# Patient Record
Sex: FEMALE
[De-identification: names, ages, dates, MRNs, and addresses within clinical notes are randomized per-mention and may not be internally consistent; named-entity substitution may affect disease eponyms.]

---

## 2018-01-16 ENCOUNTER — APPOINTMENT (OUTPATIENT)
Dept: HUMAN REPRODUCTION | Facility: CLINIC | Age: 28
End: 2018-01-16
Payer: MEDICARE

## 2018-01-16 PROCEDURE — 99215 OFFICE O/P EST HI 40 MIN: CPT

## 2018-01-16 PROCEDURE — 76830 TRANSVAGINAL US NON-OB: CPT

## 2018-01-16 PROCEDURE — 36415 COLL VENOUS BLD VENIPUNCTURE: CPT

## 2018-01-17 ENCOUNTER — OTHER (OUTPATIENT)
Age: 28
End: 2018-01-17

## 2018-01-17 ENCOUNTER — TRANSCRIPTION ENCOUNTER (OUTPATIENT)
Age: 28
End: 2018-01-17

## 2018-01-17 DIAGNOSIS — N97.9 FEMALE INFERTILITY, UNSPECIFIED: ICD-10-CM

## 2018-01-17 RX ORDER — MEDROXYPROGESTERONE ACETATE 10 MG/1
10 TABLET ORAL
Qty: 7 | Refills: 0 | Status: ACTIVE | COMMUNITY
Start: 2018-01-17 | End: 1900-01-01

## 2018-02-13 ENCOUNTER — APPOINTMENT (OUTPATIENT)
Dept: HUMAN REPRODUCTION | Facility: CLINIC | Age: 28
End: 2018-02-13

## 2018-03-22 ENCOUNTER — RX RENEWAL (OUTPATIENT)
Age: 28
End: 2018-03-22

## 2018-03-22 ENCOUNTER — APPOINTMENT (OUTPATIENT)
Dept: HUMAN REPRODUCTION | Facility: CLINIC | Age: 28
End: 2018-03-22
Payer: MEDICARE

## 2018-03-22 PROCEDURE — 99214 OFFICE O/P EST MOD 30 MIN: CPT

## 2018-03-22 RX ORDER — DOXYCYCLINE HYCLATE 100 MG/1
100 CAPSULE ORAL
Qty: 5 | Refills: 0 | Status: ACTIVE | COMMUNITY
Start: 2018-03-22 | End: 1900-01-01

## 2018-05-03 ENCOUNTER — APPOINTMENT (OUTPATIENT)
Dept: HUMAN REPRODUCTION | Facility: CLINIC | Age: 28
End: 2018-05-03
Payer: MEDICARE

## 2018-05-03 PROCEDURE — 36415 COLL VENOUS BLD VENIPUNCTURE: CPT

## 2018-05-03 PROCEDURE — 99213 OFFICE O/P EST LOW 20 MIN: CPT | Mod: 25

## 2018-05-03 PROCEDURE — 76830 TRANSVAGINAL US NON-OB: CPT

## 2018-05-03 RX ORDER — MEDROXYPROGESTERONE ACETATE 10 MG/1
10 TABLET ORAL
Qty: 7 | Refills: 0 | Status: ACTIVE | COMMUNITY
Start: 2018-05-03 | End: 1900-01-01

## 2018-05-03 RX ORDER — DOXYCYCLINE HYCLATE 100 MG/1
100 TABLET ORAL TWICE DAILY
Qty: 5 | Refills: 0 | Status: ACTIVE | COMMUNITY
Start: 2018-05-03 | End: 1900-01-01

## 2018-05-15 ENCOUNTER — APPOINTMENT (OUTPATIENT)
Dept: HUMAN REPRODUCTION | Facility: CLINIC | Age: 28
End: 2018-05-15
Payer: MEDICARE

## 2018-05-15 ENCOUNTER — APPOINTMENT (OUTPATIENT)
Dept: RADIOLOGY | Facility: HOSPITAL | Age: 28
End: 2018-05-15

## 2018-05-15 ENCOUNTER — OUTPATIENT (OUTPATIENT)
Dept: OUTPATIENT SERVICES | Facility: HOSPITAL | Age: 28
LOS: 1 days | End: 2018-05-15
Payer: COMMERCIAL

## 2018-05-15 DIAGNOSIS — N97.1 FEMALE INFERTILITY OF TUBAL ORIGIN: ICD-10-CM

## 2018-05-15 DIAGNOSIS — N97.9 FEMALE INFERTILITY, UNSPECIFIED: ICD-10-CM

## 2018-05-15 PROCEDURE — 74740 X-RAY FEMALE GENITAL TRACT: CPT

## 2018-05-15 PROCEDURE — 58340 CATHETER FOR HYSTEROGRAPHY: CPT

## 2018-05-15 PROCEDURE — 36415 COLL VENOUS BLD VENIPUNCTURE: CPT

## 2018-05-15 PROCEDURE — 99213 OFFICE O/P EST LOW 20 MIN: CPT | Mod: 25

## 2018-05-30 ENCOUNTER — APPOINTMENT (OUTPATIENT)
Dept: HUMAN REPRODUCTION | Facility: CLINIC | Age: 28
End: 2018-05-30
Payer: MEDICARE

## 2018-05-30 PROCEDURE — 99215 OFFICE O/P EST HI 40 MIN: CPT

## 2018-08-18 ENCOUNTER — EMERGENCY (EMERGENCY)
Facility: HOSPITAL | Age: 28
LOS: 1 days | Discharge: HOME | End: 2018-08-18
Attending: EMERGENCY MEDICINE | Admitting: EMERGENCY MEDICINE
Payer: COMMERCIAL

## 2018-08-18 VITALS
RESPIRATION RATE: 16 BRPM | OXYGEN SATURATION: 99 % | HEIGHT: 64 IN | HEART RATE: 68 BPM | SYSTOLIC BLOOD PRESSURE: 106 MMHG | TEMPERATURE: 98 F | WEIGHT: 141.98 LBS | DIASTOLIC BLOOD PRESSURE: 68 MMHG

## 2018-08-18 LAB
APPEARANCE UR: CLEAR — SIGNIFICANT CHANGE UP
BILIRUB UR-MCNC: NEGATIVE — SIGNIFICANT CHANGE UP
COLOR SPEC: YELLOW — SIGNIFICANT CHANGE UP
DIFF PNL FLD: NEGATIVE — SIGNIFICANT CHANGE UP
EPI CELLS # UR: SIGNIFICANT CHANGE UP
GLUCOSE UR QL: NEGATIVE MG/DL — SIGNIFICANT CHANGE UP
KETONES UR-MCNC: NEGATIVE — SIGNIFICANT CHANGE UP
LEUKOCYTE ESTERASE UR-ACNC: ABNORMAL
NITRITE UR-MCNC: NEGATIVE — SIGNIFICANT CHANGE UP
PH UR: 8 — SIGNIFICANT CHANGE UP (ref 5–8)
PROT UR-MCNC: NEGATIVE MG/DL — SIGNIFICANT CHANGE UP
RBC CASTS # UR COMP ASSIST: ABNORMAL /HPF (ref 0–4)
SP GR SPEC: 1.01 — SIGNIFICANT CHANGE UP (ref 1.01–1.02)
UROBILINOGEN FLD QL: NEGATIVE MG/DL — SIGNIFICANT CHANGE UP
WBC UR QL: ABNORMAL

## 2018-08-18 PROCEDURE — 99284 EMERGENCY DEPT VISIT MOD MDM: CPT

## 2018-08-18 NOTE — ED ADULT TRIAGE NOTE - TEMPERATURE IN CELSIUS (DEGREES C)
36.7
I have personally seen and examined this patient.  I have fully participated in the care of this patient. I have reviewed all pertinent clinical information, including history, physical exam, plan and the Resident’s note and agree except as noted.

## 2018-08-19 PROCEDURE — 99284 EMERGENCY DEPT VISIT MOD MDM: CPT | Mod: 25

## 2018-08-19 PROCEDURE — 87086 URINE CULTURE/COLONY COUNT: CPT

## 2018-08-19 PROCEDURE — 76830 TRANSVAGINAL US NON-OB: CPT | Mod: 26

## 2018-08-19 PROCEDURE — 81001 URINALYSIS AUTO W/SCOPE: CPT

## 2018-08-19 PROCEDURE — 76830 TRANSVAGINAL US NON-OB: CPT

## 2018-08-19 RX ORDER — CIPROFLOXACIN LACTATE 400MG/40ML
500 VIAL (ML) INTRAVENOUS ONCE
Qty: 0 | Refills: 0 | Status: COMPLETED | OUTPATIENT
Start: 2018-08-19 | End: 2018-08-19

## 2018-08-19 RX ORDER — CIPROFLOXACIN LACTATE 400MG/40ML
1 VIAL (ML) INTRAVENOUS
Qty: 14 | Refills: 0
Start: 2018-08-19 | End: 2018-08-25

## 2018-08-19 RX ADMIN — Medication 500 MILLIGRAM(S): at 00:51

## 2018-08-19 NOTE — ED PROVIDER NOTE - OBJECTIVE STATEMENT
Patient c/o left pelvic pain for a day. Some increase with urination. Mild dysuria. No fever. No n/v/d/c. No frequent urination. No back pain

## 2018-08-19 NOTE — ED PROVIDER NOTE - CHPI ED SYMPTOMS NEG
no blood in stool/no fever/no hematuria/no diarrhea/no nausea/no abdominal distension/no burning urination/no chills/no vomiting

## 2018-08-20 PROBLEM — E28.2 POLYCYSTIC OVARIAN SYNDROME: Chronic | Status: ACTIVE | Noted: 2018-08-19

## 2018-08-20 LAB
CULTURE RESULTS: SIGNIFICANT CHANGE UP
SPECIMEN SOURCE: SIGNIFICANT CHANGE UP

## 2018-08-20 NOTE — ED ADULT NURSE NOTE - NSIMPLEMENTINTERV_GEN_ALL_ED
Implemented All Universal Safety Interventions:  [place] to call system. Call bell, personal items and telephone within reach. Instruct patient to call for assistance. Room bathroom lighting operational. Non-slip footwear when patient is off stretcher. Physically safe environment: no spills, clutter or unnecessary equipment. Stretcher in lowest position, wheels locked, appropriate side rails in place.

## 2018-09-20 ENCOUNTER — APPOINTMENT (OUTPATIENT)
Dept: HUMAN REPRODUCTION | Facility: CLINIC | Age: 28
End: 2018-09-20

## 2019-02-05 ENCOUNTER — RX RENEWAL (OUTPATIENT)
Age: 29
End: 2019-02-05

## 2019-02-05 RX ORDER — METFORMIN ER 750 MG 750 MG/1
750 TABLET ORAL DAILY
Qty: 60 | Refills: 3 | Status: ACTIVE | COMMUNITY
Start: 2018-01-17 | End: 1900-01-01

## 2019-02-11 ENCOUNTER — APPOINTMENT (OUTPATIENT)
Dept: HUMAN REPRODUCTION | Facility: CLINIC | Age: 29
End: 2019-02-11

## 2019-02-28 ENCOUNTER — APPOINTMENT (OUTPATIENT)
Dept: HUMAN REPRODUCTION | Facility: CLINIC | Age: 29
End: 2019-02-28

## 2019-06-13 ENCOUNTER — APPOINTMENT (OUTPATIENT)
Dept: HUMAN REPRODUCTION | Facility: CLINIC | Age: 29
End: 2019-06-13
Payer: COMMERCIAL

## 2019-06-13 PROCEDURE — 99214 OFFICE O/P EST MOD 30 MIN: CPT | Mod: 25

## 2019-06-25 ENCOUNTER — APPOINTMENT (OUTPATIENT)
Dept: OBGYN | Facility: CLINIC | Age: 29
End: 2019-06-25

## 2020-04-08 NOTE — ED ADULT NURSE NOTE - CHPI ED NUR QUALITY2
Pended referral for [facility]. Please review diagnosis and sign off if you agree.     Thank you,  [facility] [phone] burning

## 2022-08-29 ENCOUNTER — APPOINTMENT (OUTPATIENT)
Dept: HUMAN REPRODUCTION | Facility: CLINIC | Age: 32
End: 2022-08-29

## 2022-08-29 PROCEDURE — 76830 TRANSVAGINAL US NON-OB: CPT

## 2022-08-29 PROCEDURE — 36415 COLL VENOUS BLD VENIPUNCTURE: CPT

## 2022-08-29 PROCEDURE — 99205 OFFICE O/P NEW HI 60 MIN: CPT | Mod: 25

## 2022-09-26 ENCOUNTER — APPOINTMENT (OUTPATIENT)
Dept: HUMAN REPRODUCTION | Facility: CLINIC | Age: 32
End: 2022-09-26

## 2022-09-26 PROCEDURE — 36415 COLL VENOUS BLD VENIPUNCTURE: CPT

## 2022-09-26 PROCEDURE — 76830 TRANSVAGINAL US NON-OB: CPT

## 2022-09-26 PROCEDURE — 99215 OFFICE O/P EST HI 40 MIN: CPT | Mod: 25

## 2022-09-27 ENCOUNTER — TRANSCRIPTION ENCOUNTER (OUTPATIENT)
Age: 32
End: 2022-09-27

## 2022-10-20 ENCOUNTER — APPOINTMENT (OUTPATIENT)
Dept: HUMAN REPRODUCTION | Facility: CLINIC | Age: 32
End: 2022-10-20

## 2022-10-20 PROCEDURE — 99215 OFFICE O/P EST HI 40 MIN: CPT

## 2022-11-01 ENCOUNTER — APPOINTMENT (OUTPATIENT)
Dept: HUMAN REPRODUCTION | Facility: CLINIC | Age: 32
End: 2022-11-01

## 2022-11-01 PROCEDURE — 36415 COLL VENOUS BLD VENIPUNCTURE: CPT

## 2022-11-01 PROCEDURE — 76830 TRANSVAGINAL US NON-OB: CPT

## 2022-11-01 PROCEDURE — 99213Y: CUSTOM | Mod: 25

## 2022-11-09 ENCOUNTER — APPOINTMENT (OUTPATIENT)
Dept: HUMAN REPRODUCTION | Facility: CLINIC | Age: 32
End: 2022-11-09

## 2022-11-09 PROCEDURE — 99213 OFFICE O/P EST LOW 20 MIN: CPT | Mod: 25

## 2022-11-09 PROCEDURE — 76857 US EXAM PELVIC LIMITED: CPT

## 2022-11-14 ENCOUNTER — APPOINTMENT (OUTPATIENT)
Dept: HUMAN REPRODUCTION | Facility: CLINIC | Age: 32
End: 2022-11-14

## 2022-11-14 PROCEDURE — 99213 OFFICE O/P EST LOW 20 MIN: CPT | Mod: 25

## 2022-11-14 PROCEDURE — 36415 COLL VENOUS BLD VENIPUNCTURE: CPT

## 2022-11-14 PROCEDURE — 76857 US EXAM PELVIC LIMITED: CPT

## 2022-11-17 ENCOUNTER — APPOINTMENT (OUTPATIENT)
Dept: HUMAN REPRODUCTION | Facility: CLINIC | Age: 32
End: 2022-11-17

## 2022-11-17 PROCEDURE — 36415 COLL VENOUS BLD VENIPUNCTURE: CPT

## 2022-11-17 PROCEDURE — 99213 OFFICE O/P EST LOW 20 MIN: CPT | Mod: 25

## 2022-11-18 ENCOUNTER — APPOINTMENT (OUTPATIENT)
Dept: HUMAN REPRODUCTION | Facility: CLINIC | Age: 32
End: 2022-11-18

## 2022-11-18 PROCEDURE — 89261 SPERM ISOLATION COMPLEX: CPT

## 2022-11-18 PROCEDURE — 58322 ARTIFICIAL INSEMINATION: CPT

## 2022-12-02 ENCOUNTER — APPOINTMENT (OUTPATIENT)
Dept: HUMAN REPRODUCTION | Facility: CLINIC | Age: 32
End: 2022-12-02

## 2022-12-02 PROCEDURE — 36415 COLL VENOUS BLD VENIPUNCTURE: CPT

## 2022-12-02 PROCEDURE — 76857 US EXAM PELVIC LIMITED: CPT

## 2022-12-02 PROCEDURE — 99213 OFFICE O/P EST LOW 20 MIN: CPT | Mod: 25

## 2022-12-09 NOTE — ED ADULT NURSE NOTE - CAS TRG GEN SKIN CONDITION
Subjective: This note was copied forward from the last encounter. Essential components for this patient record were reviewed and verified on this visit including:  recent hospitalizations, recent imaging, PMH, PSH, FH, SOC HX, Allergies, and Medications were reviewed and updated as appropriate. In addition, the assessment and plan were copied from prior office note and updated accordingly. Patient ID: Aleah Patel is a 76 y.o. female. CHAI Brewer presents today for 6 mos follow up with history breast cancer, left. The cancer was located in the 2 o'clock position of left breast.  Breast cancer risk factors include age, gender, menarche before age 14. Age of menarche was 5. Age of menopause was 46. Patient was on birth control for 6 months in her 25s. Patient is . Age of first live birth was 28. Patient did breast feed. 2019 bilateral screening mammogram:  There is a high density, irregular mass with indistinct margins seen in the posterior upper outer quadrant of left breast.     2019 left Breast U/S:  Irregular solid mass with angular margins measuring 25 x 20 x 21 mm in the left breast at 2 o'clock located 8 centimeters from the nipple. No axillary LN.     2019:  Mass, left breast at 2:00, core needle biopsy: Invasive ductal carcinoma,nuclear grade 3, see comment. 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 Breast Cancer Marker Studies:  ER: Negative  CA: Negative  Her-2/francisco Positive/3+    2019 MRI Bilateral Breast:  An irregular, enhancing mass is again noted in the left breast 2:00 which measures 2.0 x 2.2 x 2.6 cm. No axillary LN. No evidence of neoplasm on right. T2 N0 M0    10/23/2019 Completed NACT TCHP per medical oncology, Dr. Beatriz Lefort. 2019 MRI of the bilateral breast:  Near complete response to therapy on the left. There is no axillary lymphadenopathy.   No evidence of neoplasm on the right. 12/18/2019 Left needle localized lumpectomy, blue dye injection, and left axillary sentinel lymph node excision:  A. Left breast, new superior-lateral margin, excision:   -Focal adenosis, columnar cell changes and stromal fibrosis/hyalinosis, negative for malignancy;   -Rare microcalcifications in benign tubules. B. [place] lymph node #1, biopsy: Reactive node, negative for malignancy. C.  [place] lymph node #2, biopsy: Reactive node, negative for malignancy. D.  Left breast, lumpectomy with needle localization:   - Invasive adenocarcinoma of no special type (ductal, NOS), grade 3;   - Ductal carcinoma in situ, high nuclear grade, comedo/solid with necrosis;   - Scar of previous biopsy site, see comment. Comment:  Although the invasive carcinoma is very close to the superior margin in specimen D (3 mm), additional superior-lateral margins in specimen A (at least 2.0 cm in thickness) is completely negative for carcinoma.      CANCER CASE SUMMARY  Procedure: Excision  Specimen Laterality: Left  Tumor Site: Invasive Carcinoma: 12:00 (per report: HES-)  Tumor Size: Size of Largest Invasive Carcinoma: 1.4 X 1.3 X1.3 cm  Histologic Type of Invasive Carcinoma: Invasive carcinoma of no special type (ductal, NOS)  Histologic Grade: Lidya Histologic Score  Glandular/tubular differentiation: Score 3  Nuclear pleomorphism: Score 3  Mitotic rate: Score 3  Overall grade: Grade 3 (scores of 9)  Tumor Focality: Single focus of invasive carcinoma  Ductal Carcinoma In Situ (DCIS): Present  Size of DCIS: Intermingled with invasive carcinoma  Number of blocks with DCIS: 3  Number of blocks examined: 14  Architectural patternS: Comedo/solid  Nuclear grade: High  Ncrosis present: Central and focal  Lobular Carcinoma In Situ (LCIS): Not identified  Margins: Uninvolved by invasive carcinoma and DCIS  Distance from closest margin: 3 mm  Specify closest margin: surperior (see comment)  Regional lymph Nodes: Uninvolved by tumor cells  Total number of lymph nodes examined: 2  Number of sentinel nodes examined: 2  Treatment Effect: No known presurgical therapy  Lymph-Vascular Invasion: Not identified  Pathologic Staging (pTNM, AJCC 8TH Edition)  Primary tumor: pT1c  Regional lymph nodes (modifier- (sn) sentinel nodes examined: (sn)0  ER: Negative  PA: Negative  Her-2/francisco Positive/3+    Medical Oncology, Ann Salinas Recommended Ado-Trastuzumab alone for 14 cycles. 01/06/2020 cycle # 1 Ado-Trastuzumab was started. 03/04/2020 completed adjuvant radiation therapy. 05/21/2020 Bilateral Diagnostic Mammogram:  Negative for malignancy. 05/29/2020 Left Breast U/S:  Area of post surgical change in the left breast is benign. 11/12/2020 completed cycle #14 Ado-Trastuzumab     Review of Systems   Constitutional:  Negative for activity change, appetite change, chills, fatigue, fever and unexpected weight change. Doing well. She is without breast related complaints on this visit. Respiratory:  Negative for cough and shortness of breath. Cardiovascular:  Negative for chest pain and palpitations. Gastrointestinal:  Negative for blood in stool, constipation, diarrhea (History of irritable bowel. A colonoscopy in October 2021 was reported as negative.) and nausea. Musculoskeletal:  Negative for arthralgias and back pain. Neurological:  Negative for light-headedness and headaches. Psychiatric/Behavioral:  The patient is not nervous/anxious. Objective:   Physical Exam  Vitals and nursing note reviewed. Constitutional:       General: She is not in acute distress. Appearance: She is well-developed. She is not diaphoretic. Comments: ECOG remains stable at 0; no apparent distress   HENT:      Head: Normocephalic and atraumatic. Mouth/Throat:      Pharynx: No oropharyngeal exudate. Eyes:      General: No scleral icterus. Right eye: No discharge. Left eye: No discharge. normal.   Psychiatric:         Behavior: Behavior normal.         Thought Content: Thought content normal.         Judgment: Judgment normal.      Assessment:     Luiza Johnson is a 76 y.o. extremely pleasant female who presents for follow-up of her left breast cancer. Luiza Johnson is a pleasant 76 y.o. female who had a Left breast cancer; the malignancy was located in the 2 o'clock position of left breast.     On 05/06/2019: Mass, left breast at 2:00, core needle biopsy: Invasive ductal carcinoma,nuclear grade 3, see comment. 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 Breast Cancer Marker Studies:  ER: Negative  VT: Negative  Her-2/francisco Positive/3+    MRI Bilateral Breast 05/23/2019: An irregular, enhancing mass is again noted in the left breast 2:00 which measures 2.0 x 2.2 x 2.6 cm.  T2 N0 M0    10/23/2019 Completed NACT TCHP per medical oncology, Dr. Dwight Hedrick. MRI Breast Bilateral 11/07/2019: Near complete response to therapy. 12/18/2019 Left needle localized lumpectomy, blue dye injection, and left axillary sentinel lymph node excision:  A. Left breast, new superior-lateral margin, excision:   -Focal adenosis, columnar cell changes and stromal fibrosis/hyalinosis, negative for malignancy;   -Rare microcalcifications in benign tubules. B. [place] lymph node #1, biopsy: Reactive node, negative for malignancy. C.  [place] lymph node #2, biopsy: Reactive node, negative for malignancy. D.  Left breast, lumpectomy with needle localization:   - Invasive adenocarcinoma of no special type (ductal, NOS), grade 3;   - Ductal carcinoma in situ, high nuclear grade, comedo/solid with necrosis;   - Scar of previous biopsy site, see comment. Comment:  Although the invasive carcinoma is very close to the superior margin in specimen D (3 mm), additional superior-lateral margins in specimen A (at least 2.0 cm in thickness) is completely negative for carcinoma.      CANCER CASE SUMMARY: Left invasive carcinoma, 12:00. Tumor size 1.4 x 1.3 x 1.3 cm. Invasive carcinoma of no special type, ductal, NOS. Overall grade 3 (scores of 9). Single focus of invasive carcinoma. Ductal Carcinoma In Situ (DCIS): Present. Size of DCIS: Intermingled with invasive carcinoma Number of blocks with DCIS: 3  Number of blocks examined: 14 Architectural patternS: Comedo/solid. Nuclear grade: High. Necrosis present: Central and focal  Lobular Carcinoma In Situ (LCIS): Not identified  Margins: Uninvolved by invasive carcinoma and DCIS  Regional lymph Nodes: Uninvolved by tumor cells (0/2). Treatment Effect: No known presurgical therapy. Lymph-Vascular Invasion: Not identified  Pathologic Staging (pTNM, AJCC 8TH Edition) Primary tumor: pT1c, Regional lymph nodes (modifier- (sn) sentinel nodes examined: (sn)0. ER: Negative; WI: Negative; Her-2/francisco Positive/3+    Medical OncologyEsa Recommended Ado-Trastuzumab alone for 14 cycles. Cycle # 1 Ado-Trastuzumab was on 01/06/2020.  03/11/2020 completed adjuvant RT   05/21/2020 Bilateral Diagnostic Mammogram:  Negative for malignancy. 05/29/2020 Left Breast U/S:  Area of post surgical change in the left breast is benign. 11/12/2020 completed cycle # 14 Ado-Trastuzumab.  06/01/2021 bilateral screening mammogram: Negative, BI-RADS 1.  08/25/2021 Right IJ mediport removal and excision of left axillary epidermal inclusion cyst, 5 mm: Negative for malignancy. 09/03/2021 completed 4 visits with occupational therapy; discharged to continue at home. 09/2021 screening colonoscopy per Dr. Dom Shrestha @ [facility]. 12/01/2021 Clinical follow up is without evidence of recurrent disease. We will plan to see her back in June with mammogram same day. Continue lymphedema management at home. 06/13/2022 bilateral screening mammogram: Negative, BI-RADS 1.  12/14/2022 clinical follow-up is without evidence of recurrent disease.   Lymphedema is controlled with compression vest daily. We reviewed NCCN guidelines for breast cancer follow up. We had a long discussion regarding her breast density and current recommendations from [employer] of radiology for consideration of bilateral whole breast ultrasounds in addition to an annual screening mammogram.  She was advised to contact her insurance company to ensure coverage of the imaging and will let us know if she would like to proceed. Otherwise, we will plan to see in 6 months with a bilateral screening mammogram same day. Plan:     Continue monthly breast/chest wall self examination; detailed instructions reviewed today. Bring any changes to your physician's attention. Continue healthy diet and exercise routinely as tolerated. Avoid alcohol. Limit caffeine intake. Wear a good supportive bra. Repeat mammogram June 2023. Continue follow up with Primary Care/Medical Oncology, and all specialties as directed. Bilateral whole breast ultrasounds to be done should she choose to do so-no office visit required. RTC 6 months with bilateral screening mammogram same day. I spent a total of 20 minutes on the date of the service which included preparing to see the patient, face-to-face patient care, completing clinical documentation, obtaining and/or reviewing separately obtained history, performing a medically appropriate examination, counseling and educating the patient/family/caregiver, ordering medications, tests, or procedures, communicating with other HCPs (not separately reported), independently interpreting results (not separately reported), communicating results to the patient/family/caregiver and care coordination (not separately reported). This document is generated, in part, by voice recognition software and thus syntax and grammatical errors are possible.     Amairani [address] RN, MSN, APRN-CNP, [address]  Advanced Oncology Certified Nurse Practitioner  Department of Breast Surgery  [facility] [place]/  [facility] in collaboration with Dr. Yulissa Jacobson.  Carlos Manuel/Dr. Rohit Paniagua/Dr. Valdes Board APRN-CNP [place]

## 2022-12-15 ENCOUNTER — APPOINTMENT (OUTPATIENT)
Dept: HUMAN REPRODUCTION | Facility: CLINIC | Age: 32
End: 2022-12-15

## 2022-12-15 PROCEDURE — 99213 OFFICE O/P EST LOW 20 MIN: CPT | Mod: 25

## 2022-12-15 PROCEDURE — 36415 COLL VENOUS BLD VENIPUNCTURE: CPT

## 2022-12-15 PROCEDURE — 76857 US EXAM PELVIC LIMITED: CPT

## 2022-12-19 ENCOUNTER — APPOINTMENT (OUTPATIENT)
Dept: HUMAN REPRODUCTION | Facility: CLINIC | Age: 32
End: 2022-12-19

## 2022-12-19 PROCEDURE — 36415 COLL VENOUS BLD VENIPUNCTURE: CPT

## 2022-12-21 ENCOUNTER — APPOINTMENT (OUTPATIENT)
Dept: HUMAN REPRODUCTION | Facility: CLINIC | Age: 32
End: 2022-12-21

## 2022-12-21 PROCEDURE — 36415 COLL VENOUS BLD VENIPUNCTURE: CPT

## 2022-12-21 PROCEDURE — 76857 US EXAM PELVIC LIMITED: CPT

## 2022-12-21 PROCEDURE — 99213 OFFICE O/P EST LOW 20 MIN: CPT | Mod: 25

## 2022-12-23 ENCOUNTER — APPOINTMENT (OUTPATIENT)
Dept: HUMAN REPRODUCTION | Facility: CLINIC | Age: 32
End: 2022-12-23

## 2022-12-23 PROCEDURE — 58322 ARTIFICIAL INSEMINATION: CPT

## 2022-12-23 PROCEDURE — 89260 SPERM ISOLATION SIMPLE: CPT

## 2023-01-10 ENCOUNTER — APPOINTMENT (OUTPATIENT)
Dept: HUMAN REPRODUCTION | Facility: CLINIC | Age: 33
End: 2023-01-10

## 2023-01-14 ENCOUNTER — APPOINTMENT (OUTPATIENT)
Dept: HUMAN REPRODUCTION | Facility: CLINIC | Age: 33
End: 2023-01-14
Payer: COMMERCIAL

## 2023-01-14 PROCEDURE — 36415 COLL VENOUS BLD VENIPUNCTURE: CPT

## 2023-01-14 PROCEDURE — 76830 TRANSVAGINAL US NON-OB: CPT

## 2023-01-14 PROCEDURE — 99213 OFFICE O/P EST LOW 20 MIN: CPT | Mod: 25

## 2023-01-16 ENCOUNTER — EMERGENCY (EMERGENCY)
Facility: HOSPITAL | Age: 33
LOS: 0 days | Discharge: HOME | End: 2023-01-17
Attending: EMERGENCY MEDICINE
Payer: COMMERCIAL

## 2023-01-16 VITALS — WEIGHT: 134.92 LBS | HEIGHT: 64 IN

## 2023-01-16 LAB
ALBUMIN SERPL ELPH-MCNC: 3.7 G/DL — SIGNIFICANT CHANGE UP (ref 3.3–5)
ALP SERPL-CCNC: 57 U/L — SIGNIFICANT CHANGE UP (ref 40–120)
ALT FLD-CCNC: 41 U/L — SIGNIFICANT CHANGE UP (ref 12–78)
ANION GAP SERPL CALC-SCNC: 9 MMOL/L — SIGNIFICANT CHANGE UP (ref 5–17)
APPEARANCE UR: CLEAR — SIGNIFICANT CHANGE UP
AST SERPL-CCNC: 35 U/L — SIGNIFICANT CHANGE UP (ref 15–37)
BACTERIA # UR AUTO: ABNORMAL
BASOPHILS # BLD AUTO: 0.06 K/UL — SIGNIFICANT CHANGE UP (ref 0–0.2)
BASOPHILS NFR BLD AUTO: 0.5 % — SIGNIFICANT CHANGE UP (ref 0–2)
BILIRUB SERPL-MCNC: 0.6 MG/DL — SIGNIFICANT CHANGE UP (ref 0.2–1.2)
BILIRUB UR-MCNC: NEGATIVE — SIGNIFICANT CHANGE UP
BLD GP AB SCN SERPL QL: SIGNIFICANT CHANGE UP
BUN SERPL-MCNC: 6 MG/DL — LOW (ref 7–23)
CALCIUM SERPL-MCNC: 9.3 MG/DL — SIGNIFICANT CHANGE UP (ref 8.5–10.1)
CHLORIDE SERPL-SCNC: 103 MMOL/L — SIGNIFICANT CHANGE UP (ref 96–108)
CO2 SERPL-SCNC: 27 MMOL/L — SIGNIFICANT CHANGE UP (ref 22–31)
COLOR SPEC: YELLOW — SIGNIFICANT CHANGE UP
CREAT SERPL-MCNC: 0.66 MG/DL — SIGNIFICANT CHANGE UP (ref 0.5–1.3)
DIFF PNL FLD: ABNORMAL
EGFR: 119 ML/MIN/1.73M2 — SIGNIFICANT CHANGE UP
EOSINOPHIL # BLD AUTO: 0.01 K/UL — SIGNIFICANT CHANGE UP (ref 0–0.5)
EOSINOPHIL NFR BLD AUTO: 0.1 % — SIGNIFICANT CHANGE UP (ref 0–6)
EPI CELLS # UR: SIGNIFICANT CHANGE UP
GLUCOSE SERPL-MCNC: 111 MG/DL — HIGH (ref 70–99)
GLUCOSE UR QL: NEGATIVE — SIGNIFICANT CHANGE UP
HCG SERPL-ACNC: <1 MIU/ML — SIGNIFICANT CHANGE UP
HCT VFR BLD CALC: 39.5 % — SIGNIFICANT CHANGE UP (ref 34.5–45)
HGB BLD-MCNC: 13.2 G/DL — SIGNIFICANT CHANGE UP (ref 11.5–15.5)
IMM GRANULOCYTES NFR BLD AUTO: 0.2 % — SIGNIFICANT CHANGE UP (ref 0–0.9)
KETONES UR-MCNC: ABNORMAL
LACTATE SERPL-SCNC: 1.1 MMOL/L — SIGNIFICANT CHANGE UP (ref 0.7–2)
LEUKOCYTE ESTERASE UR-ACNC: ABNORMAL
LIDOCAIN IGE QN: 76 U/L — SIGNIFICANT CHANGE UP (ref 73–393)
LYMPHOCYTES # BLD AUTO: 1.29 K/UL — SIGNIFICANT CHANGE UP (ref 1–3.3)
LYMPHOCYTES # BLD AUTO: 9.9 % — LOW (ref 13–44)
MCHC RBC-ENTMCNC: 28.3 PG — SIGNIFICANT CHANGE UP (ref 27–34)
MCHC RBC-ENTMCNC: 33.4 GM/DL — SIGNIFICANT CHANGE UP (ref 32–36)
MCV RBC AUTO: 84.8 FL — SIGNIFICANT CHANGE UP (ref 80–100)
MONOCYTES # BLD AUTO: 0.73 K/UL — SIGNIFICANT CHANGE UP (ref 0–0.9)
MONOCYTES NFR BLD AUTO: 5.6 % — SIGNIFICANT CHANGE UP (ref 2–14)
NEUTROPHILS # BLD AUTO: 10.9 K/UL — HIGH (ref 1.8–7.4)
NEUTROPHILS NFR BLD AUTO: 83.7 % — HIGH (ref 43–77)
NITRITE UR-MCNC: NEGATIVE — SIGNIFICANT CHANGE UP
PH UR: 5 — SIGNIFICANT CHANGE UP (ref 5–8)
PLATELET # BLD AUTO: 278 K/UL — SIGNIFICANT CHANGE UP (ref 150–400)
POTASSIUM SERPL-MCNC: 3.7 MMOL/L — SIGNIFICANT CHANGE UP (ref 3.5–5.3)
POTASSIUM SERPL-SCNC: 3.7 MMOL/L — SIGNIFICANT CHANGE UP (ref 3.5–5.3)
PROT SERPL-MCNC: 8.2 GM/DL — SIGNIFICANT CHANGE UP (ref 6–8.3)
PROT UR-MCNC: 30 MG/DL
RAPID RVP RESULT: SIGNIFICANT CHANGE UP
RBC # BLD: 4.66 M/UL — SIGNIFICANT CHANGE UP (ref 3.8–5.2)
RBC # FLD: 12.6 % — SIGNIFICANT CHANGE UP (ref 10.3–14.5)
RBC CASTS # UR COMP ASSIST: SIGNIFICANT CHANGE UP /HPF (ref 0–4)
SARS-COV-2 RNA SPEC QL NAA+PROBE: SIGNIFICANT CHANGE UP
SODIUM SERPL-SCNC: 139 MMOL/L — SIGNIFICANT CHANGE UP (ref 135–145)
SP GR SPEC: 1.02 — SIGNIFICANT CHANGE UP (ref 1.01–1.02)
UROBILINOGEN FLD QL: NEGATIVE — SIGNIFICANT CHANGE UP
WBC # BLD: 13.02 K/UL — HIGH (ref 3.8–10.5)
WBC # FLD AUTO: 13.02 K/UL — HIGH (ref 3.8–10.5)
WBC UR QL: SIGNIFICANT CHANGE UP /HPF (ref 0–5)

## 2023-01-16 PROCEDURE — 83605 ASSAY OF LACTIC ACID: CPT

## 2023-01-16 PROCEDURE — 86900 BLOOD TYPING SEROLOGIC ABO: CPT

## 2023-01-16 PROCEDURE — 86850 RBC ANTIBODY SCREEN: CPT

## 2023-01-16 PROCEDURE — 96374 THER/PROPH/DIAG INJ IV PUSH: CPT

## 2023-01-16 PROCEDURE — 0225U NFCT DS DNA&RNA 21 SARSCOV2: CPT

## 2023-01-16 PROCEDURE — 96375 TX/PRO/DX INJ NEW DRUG ADDON: CPT

## 2023-01-16 PROCEDURE — 80053 COMPREHEN METABOLIC PANEL: CPT

## 2023-01-16 PROCEDURE — 76830 TRANSVAGINAL US NON-OB: CPT | Mod: 26

## 2023-01-16 PROCEDURE — 93975 VASCULAR STUDY: CPT

## 2023-01-16 PROCEDURE — 99285 EMERGENCY DEPT VISIT HI MDM: CPT | Mod: 25

## 2023-01-16 PROCEDURE — 87086 URINE CULTURE/COLONY COUNT: CPT

## 2023-01-16 PROCEDURE — 71045 X-RAY EXAM CHEST 1 VIEW: CPT | Mod: 26

## 2023-01-16 PROCEDURE — 93975 VASCULAR STUDY: CPT | Mod: 26

## 2023-01-16 PROCEDURE — 99285 EMERGENCY DEPT VISIT HI MDM: CPT

## 2023-01-16 PROCEDURE — 74177 CT ABD & PELVIS W/CONTRAST: CPT | Mod: MA

## 2023-01-16 PROCEDURE — 36415 COLL VENOUS BLD VENIPUNCTURE: CPT

## 2023-01-16 PROCEDURE — 96376 TX/PRO/DX INJ SAME DRUG ADON: CPT

## 2023-01-16 PROCEDURE — 76830 TRANSVAGINAL US NON-OB: CPT

## 2023-01-16 PROCEDURE — 83690 ASSAY OF LIPASE: CPT

## 2023-01-16 PROCEDURE — 81001 URINALYSIS AUTO W/SCOPE: CPT

## 2023-01-16 PROCEDURE — 85025 COMPLETE CBC W/AUTO DIFF WBC: CPT

## 2023-01-16 PROCEDURE — 86901 BLOOD TYPING SEROLOGIC RH(D): CPT

## 2023-01-16 PROCEDURE — 71045 X-RAY EXAM CHEST 1 VIEW: CPT

## 2023-01-16 PROCEDURE — 74177 CT ABD & PELVIS W/CONTRAST: CPT | Mod: 26,MA

## 2023-01-16 PROCEDURE — 84702 CHORIONIC GONADOTROPIN TEST: CPT

## 2023-01-16 RX ORDER — MORPHINE SULFATE 50 MG/1
4 CAPSULE, EXTENDED RELEASE ORAL ONCE
Refills: 0 | Status: DISCONTINUED | OUTPATIENT
Start: 2023-01-16 | End: 2023-01-16

## 2023-01-16 RX ORDER — ONDANSETRON 8 MG/1
4 TABLET, FILM COATED ORAL ONCE
Refills: 0 | Status: COMPLETED | OUTPATIENT
Start: 2023-01-16 | End: 2023-01-16

## 2023-01-16 RX ORDER — SODIUM CHLORIDE 9 MG/ML
1000 INJECTION INTRAMUSCULAR; INTRAVENOUS; SUBCUTANEOUS ONCE
Refills: 0 | Status: COMPLETED | OUTPATIENT
Start: 2023-01-16 | End: 2023-01-16

## 2023-01-16 RX ORDER — FAMOTIDINE 10 MG/ML
20 INJECTION INTRAVENOUS ONCE
Refills: 0 | Status: COMPLETED | OUTPATIENT
Start: 2023-01-16 | End: 2023-01-16

## 2023-01-16 RX ORDER — ONDANSETRON 8 MG/1
1 TABLET, FILM COATED ORAL
Qty: 20 | Refills: 0
Start: 2023-01-16

## 2023-01-16 RX ADMIN — SODIUM CHLORIDE 1000 MILLILITER(S): 9 INJECTION INTRAMUSCULAR; INTRAVENOUS; SUBCUTANEOUS at 21:09

## 2023-01-16 RX ADMIN — ONDANSETRON 4 MILLIGRAM(S): 8 TABLET, FILM COATED ORAL at 23:37

## 2023-01-16 RX ADMIN — ONDANSETRON 4 MILLIGRAM(S): 8 TABLET, FILM COATED ORAL at 21:08

## 2023-01-16 RX ADMIN — MORPHINE SULFATE 4 MILLIGRAM(S): 50 CAPSULE, EXTENDED RELEASE ORAL at 21:08

## 2023-01-16 RX ADMIN — FAMOTIDINE 20 MILLIGRAM(S): 10 INJECTION INTRAVENOUS at 21:08

## 2023-01-16 NOTE — ED STATDOCS - OBJECTIVE STATEMENT
33 y/o F with a PMHx of PCOS presents to the ED c/o abd pain and nausea upon waking up. pt also noted general malaise. Took a nap, woke up at 6 pm and had RLQ pain. Felt feverish and nauseous. Did not vomit.

## 2023-01-16 NOTE — ED STATDOCS - PROGRESS NOTE DETAILS
33 y/o F with PMH of PCOS presents with abdominal pain, nausea today. +subjective fever. Denies vomiting. PE: Well appearing. Cardiac: s1s2, RRR. lungs; CTAB. Abdomen: NBS x4, soft, +RLQ tenderness. No CVAT. A/P; r/o appendicitis, ovarian pathology. Plan for labs CT, US, analgesia, antiemetics, reassess. - Ranjan Lema PA-C Labs and imaging with no actionable findings. CT findings regarding pancreas reviewed. Patient notes history of pancreatic cyst which was drained. Feels improvement in symptoms following 2nd dose of zofran, will attempt PO trial. - Ranjan Lema PA-C pt is able to eat crackers and drink, wants to go home will prescribe zofran and melba Redd DO

## 2023-01-16 NOTE — ED ADULT TRIAGE NOTE - CHIEF COMPLAINT QUOTE
Pt c/o RLQ abdominal painx2 hrs. No medication taken PTA. Vomit x1 in am, no food all day bc Nauseas. Hx of gallbladder removal 14 years ago. - Allergies. LMP today

## 2023-01-16 NOTE — ED STATDOCS - NSFOLLOWUPINSTRUCTIONS_ED_ALL_ED_FT
Gastroenteritis viral en los adultos    Viral Gastroenteritis, Adult    Body outline showing the digestive tract, including the stomach, small intestine, and large intestine.   La gastroenteritis viral también se conoce conor gripe estomacal. Esta afección podría afectarle el estómago, el intestino hernandez y el intestino grueso. Puede causar heces líquidas (diarrea), fiebre y vómitos repentinos. Esta afección es el resultado de determinados microbios (virus). Estos microbios pueden transmitirse de bennie persona a otra con mucha facilidad (son contagiosos).    La diarrea y los vómitos pueden hacer que se sienta débil y que no tenga bennie cantidad suficiente de agua en el cuerpo (se deshidrate). Thendara puede hacerlo sentir cansado y sediento, producirle sequedad en la boca y disminuir la frecuencia con la que orina. Es importante restituir los líquidos que se pierden a causa de la diarrea y los vómitos.      ¿Cuáles son las causas?    •Usted puede enfermarse al contagiarse microbios de otras personas.    •También puede enfermarse de las siguientes maneras:  •Al comer alimentos, beber agua o tocar bennie superficie que tengan los microbios (están contaminados).      •Al compartir utensilios u otros artículos personales con bennie persona enferma.          ¿Qué incrementa el riesgo?    •Tener debilitado el sistema de defensa del cuerpo (sistema inmunitario).      •Vivir con carmen o más niños menores de 2 años.      •Vivir en un hogar de ancianos.      •Viajar en cruceros.        ¿Cuáles son los signos o síntomas?    Los síntomas de esta afección aparecen repentinamente. Pueden durar algunos días o incluso bennie semana.•Los síntomas frecuentes incluyen los siguientes:  •Heces acuosas.      •Vómitos.      •Otros síntomas pueden incluir los siguientes:  •Fiebre.      •Dolor de vinay.      •Sensación de cansancio (fatiga).      •Dolor de vientre (abdomen).      •Escalofríos.      •Sensación de debilidad.      •Sensación de que va a vomitar (náuseas).      •Christine musculares.      •Falta de apetito.          ¿Cómo se trata?    Por lo general, esta afección desaparece por sí devaughn. El objetivo del tratamiento es reponer los líquidos que se pierden. El tratamiento de esta afección puede incluir:  •Bennie SRO (solución de rehidratación oral). Esta es bennie bebida que ayuda a reponer los líquidos y los minerales que el cuerpo perdió. Se vende en farmacias y tiendas.      •Medicamentos para aliviar los síntomas.      •Suplementos probióticos para reducir los síntomas de heces acuosas.      •Recibir líquidos por un tubo (catéter) intravenoso si es necesario.      Los adultos mayores y las personas que tienen otras enfermedades o que tienen debilitado el sistema de defensa del cuerpo corren un mayor riesgo al no tener bennie cantidad suficiente de agua del cuerpo.      Siga estas indicaciones en taveras casa:      Comida y bebida   Three cups showing dark yellow, yellow, and pale yellow pee.   •[place] bennie SRO conor se lo haya indicado el médico.    •En la medida en que pueda, rebekah líquidos transparentes en pequeñas cantidades. Los líquidos transparentes son, por ejemplo:  •Agua.      •Trocitos de hielo.      •Jugo de frutas con agua agregada (diluido).      •Bebidas deportivas de bajas calorías.        •Rebekah suficiente líquido para mantener el pis (la orina) de color amarillo pálido.      •Coma pequeñas cantidades de alimentos saludables cada 3 a 4 horas según taveras tolerancia. Estos pueden incluir cereales integrales, frutas, verduras, shoshana magras y yogur.      •Evite los líquidos con alto contenido de azúcar o cafeína. Thendara incluye bebidas energéticas, bebidas deportivas y gaseosas.      •Evite los alimentos condimentados o con alto contenido de grasa.      •Evite philomena alcohol.        Indicaciones generales   Washing hands with soap and water.   •Lávese las aline con frecuencia. Thendara es muy importante después de hacer heces acuosas o vomitar. Use un desinfectante para aline si no dispone de agua y jabón.      •Asegúrese de que todas las personas que viven en taveras casa se laven david las aline y con frecuencia.      •Use los medicamentos de venta gabriel y los recetados solamente conor se lo haya indicado el médico.      •Descanse en taveras casa hasta sentirse mejor.      •Controle taveras afección para detectar cualquier cambio.      •[place] un baño caliente para ayudar a disminuir el ardor o el dolor que produce la diarrea.      •Concurra a todas las visitas de seguimiento.        Comuníquese con un médico si:    •No puede retener los líquidos.      •Felicita síntomas empeoran.      •Aparecen nuevos síntomas.      •Se siente aturdido o mareado.      •Tiene calambres musculares.        Solicite ayuda de inmediato si:    •Siente dolor en el pecho.      •Tiene problemas para respirar o respira muy rápido.      •Tiene latidos cardíacos acelerados.      •Se siente muy débil o se desmaya.      •Siente un dolor de vinay intenso, rigidez en el darren, o ambas cosas.      •Tiene bennie erupción cutánea.      •Tiene dolor muy intenso en el vientre, cólicos o meteorismo.      •Siente la piel fría y húmeda.      •Se siente desorientado (confundido).      •Siente dolor al orinar.    •Presenta signos de no tener suficiente agua en el cuerpo, por ejemplo:  •La orina es oscura, es muy escasa o no orina.      •Labios agrietados.      •Sequedad de boca.      •Ojos hundidos.      •Se siente muy somnoliento.      •Sensación de debilidad.      •Presenta signos de sangrado, conor los siguientes:  •Observa roque en el vómito.      •El vómito se asemeja al poso del café.      •Hace materia fecal con roque, de color april o que parece alquitrán.        Estos síntomas pueden indicar bennie emergencia. Solicite ayuda de inmediato. Llame al 911.   • No espere a haydee si los síntomas desaparecen.       • No conduzca por felicita propios medios hasta el hospital.         Resumen    •La gastroenteritis viral también se conoce conor gripe estomacal.      •Esta afección puede provocar heces líquidas (diarrea), fiebre y vómitos de forma repentina.      •Estos microbios pueden transmitirse de bennie persona a otra con mucha facilidad.      •Rebekah bennie SRO (solución de rehidratación oral), conor se lo haya indicado el médico. Es bennie bebida que se vende en farmacias y tiendas.      •Lávese las aline con frecuencia, especialmente después de tener heces líquidas o vómitos. Use un desinfectante para aline si no dispone de agua y jabón.      Esta información no tiene conor fin reemplazar el consejo del médico. Asegúrese de hacerle al médico cualquier pregunta que tenga.      Document Revised: 11/10/2022 Document Reviewed: 11/10/2022    Elsevier Patient Education © 2022 Elsevier Inc.

## 2023-01-16 NOTE — ED STATDOCS - ATTENDING APP SHARED VISIT CONTRIBUTION OF CARE
Dr. Redd: I performed a face to face bedside interview with patient regarding history of present illness, review of symptoms and past medical history. I completed an independent physical exam.  I have discussed patient's plan of care with PA.   I agree with note as stated above, having amended the EMR as needed to reflect my findings.   This includes HISTORY OF PRESENT ILLNESS, HIV, PAST MEDICAL/SURGICAL/FAMILY/SOCIAL HISTORY, ALLERGIES AND HOME MEDICATIONS, REVIEW OF SYSTEMS, PHYSICAL EXAM, and any PROGRESS NOTES during the time I functioned as the attending physician for this patient.

## 2023-01-16 NOTE — ED STATDOCS - CLINICAL SUMMARY MEDICAL DECISION MAKING FREE TEXT BOX
pt p/w appendicitis vs ovarian cyst less likely torsion labs, UCG, US to r/o ovarian abnormality and CT to r/o appendicitis.

## 2023-01-16 NOTE — ED ADULT NURSE NOTE - OBJECTIVE STATEMENT
Pt A&Ox4, presents to the ED c/o RLQ abdominal pain x2 hrs. No medication taken PTA. Vomit x1 in am, no food all day due to nausea. Hx of gallbladder removal 14 years ago. - Allergies. LMP today. Denies chest pain SOB. Endorses chills.

## 2023-01-16 NOTE — ED STATDOCS - PATIENT PORTAL LINK FT
You can access the FollowMyHealth Patient Portal offered by [facility] by registering at the following website: [email or web address]. By joining [employer]’s FollowMyHealth portal, you will also be able to view your health information using other applications (apps) compatible with our system.

## 2023-01-17 VITALS
DIASTOLIC BLOOD PRESSURE: 70 MMHG | HEART RATE: 91 BPM | TEMPERATURE: 99 F | RESPIRATION RATE: 20 BRPM | OXYGEN SATURATION: 99 % | SYSTOLIC BLOOD PRESSURE: 119 MMHG

## 2023-01-17 DIAGNOSIS — E28.2 POLYCYSTIC OVARIAN SYNDROME: ICD-10-CM

## 2023-01-17 DIAGNOSIS — K52.9 NONINFECTIVE GASTROENTERITIS AND COLITIS, UNSPECIFIED: ICD-10-CM

## 2023-01-17 DIAGNOSIS — R11.0 NAUSEA: ICD-10-CM

## 2023-01-17 DIAGNOSIS — R53.81 OTHER MALAISE: ICD-10-CM

## 2023-01-17 DIAGNOSIS — R10.31 RIGHT LOWER QUADRANT PAIN: ICD-10-CM

## 2023-01-17 DIAGNOSIS — Z20.822 CONTACT WITH AND (SUSPECTED) EXPOSURE TO COVID-19: ICD-10-CM

## 2023-01-17 DIAGNOSIS — Z87.19 PERSONAL HISTORY OF OTHER DISEASES OF THE DIGESTIVE SYSTEM: ICD-10-CM

## 2023-01-17 DIAGNOSIS — Z79.84 LONG TERM (CURRENT) USE OF ORAL HYPOGLYCEMIC DRUGS: ICD-10-CM

## 2023-01-17 LAB
CULTURE RESULTS: NO GROWTH — SIGNIFICANT CHANGE UP
SPECIMEN SOURCE: SIGNIFICANT CHANGE UP

## 2023-01-19 ENCOUNTER — APPOINTMENT (OUTPATIENT)
Dept: HUMAN REPRODUCTION | Facility: CLINIC | Age: 33
End: 2023-01-19

## 2023-01-20 ENCOUNTER — APPOINTMENT (OUTPATIENT)
Dept: HUMAN REPRODUCTION | Facility: CLINIC | Age: 33
End: 2023-01-20

## 2023-01-21 ENCOUNTER — APPOINTMENT (OUTPATIENT)
Dept: HUMAN REPRODUCTION | Facility: CLINIC | Age: 33
End: 2023-01-21

## 2023-01-26 ENCOUNTER — APPOINTMENT (OUTPATIENT)
Dept: HUMAN REPRODUCTION | Facility: CLINIC | Age: 33
End: 2023-01-26
Payer: COMMERCIAL

## 2023-01-26 PROCEDURE — 99213 OFFICE O/P EST LOW 20 MIN: CPT | Mod: 25

## 2023-01-26 PROCEDURE — 76857 US EXAM PELVIC LIMITED: CPT

## 2023-01-26 PROCEDURE — 36415 COLL VENOUS BLD VENIPUNCTURE: CPT

## 2023-01-30 ENCOUNTER — APPOINTMENT (OUTPATIENT)
Dept: HUMAN REPRODUCTION | Facility: CLINIC | Age: 33
End: 2023-01-30
Payer: COMMERCIAL

## 2023-01-30 PROCEDURE — 76857 US EXAM PELVIC LIMITED: CPT

## 2023-01-30 PROCEDURE — 99213 OFFICE O/P EST LOW 20 MIN: CPT | Mod: 25

## 2023-01-31 ENCOUNTER — APPOINTMENT (OUTPATIENT)
Dept: HUMAN REPRODUCTION | Facility: CLINIC | Age: 33
End: 2023-01-31
Payer: COMMERCIAL

## 2023-01-31 PROCEDURE — 89260 SPERM ISOLATION SIMPLE: CPT

## 2023-01-31 PROCEDURE — 58322 ARTIFICIAL INSEMINATION: CPT

## 2023-02-16 ENCOUNTER — APPOINTMENT (OUTPATIENT)
Dept: HUMAN REPRODUCTION | Facility: CLINIC | Age: 33
End: 2023-02-16

## 2023-02-17 ENCOUNTER — APPOINTMENT (OUTPATIENT)
Dept: HUMAN REPRODUCTION | Facility: CLINIC | Age: 33
End: 2023-02-17
Payer: COMMERCIAL

## 2023-02-17 PROCEDURE — 76857 US EXAM PELVIC LIMITED: CPT

## 2023-02-17 PROCEDURE — 36415 COLL VENOUS BLD VENIPUNCTURE: CPT

## 2023-02-17 PROCEDURE — 99213 OFFICE O/P EST LOW 20 MIN: CPT | Mod: 25

## 2023-03-10 ENCOUNTER — APPOINTMENT (OUTPATIENT)
Dept: HUMAN REPRODUCTION | Facility: CLINIC | Age: 33
End: 2023-03-10

## 2023-03-20 ENCOUNTER — EMERGENCY (EMERGENCY)
Facility: HOSPITAL | Age: 33
LOS: 0 days | Discharge: HOME | End: 2023-03-20
Attending: EMERGENCY MEDICINE
Payer: COMMERCIAL

## 2023-03-20 VITALS
HEART RATE: 65 BPM | DIASTOLIC BLOOD PRESSURE: 80 MMHG | TEMPERATURE: 99 F | SYSTOLIC BLOOD PRESSURE: 127 MMHG | RESPIRATION RATE: 18 BRPM | OXYGEN SATURATION: 99 %

## 2023-03-20 VITALS
OXYGEN SATURATION: 100 % | RESPIRATION RATE: 18 BRPM | HEIGHT: 64 IN | TEMPERATURE: 98 F | DIASTOLIC BLOOD PRESSURE: 81 MMHG | HEART RATE: 64 BPM | WEIGHT: 143.96 LBS | SYSTOLIC BLOOD PRESSURE: 126 MMHG

## 2023-03-20 DIAGNOSIS — R00.2 PALPITATIONS: ICD-10-CM

## 2023-03-20 DIAGNOSIS — R11.2 NAUSEA WITH VOMITING, UNSPECIFIED: ICD-10-CM

## 2023-03-20 DIAGNOSIS — R10.9 UNSPECIFIED ABDOMINAL PAIN: ICD-10-CM

## 2023-03-20 DIAGNOSIS — E28.2 POLYCYSTIC OVARIAN SYNDROME: ICD-10-CM

## 2023-03-20 DIAGNOSIS — Z90.49 ACQUIRED ABSENCE OF OTHER SPECIFIED PARTS OF DIGESTIVE TRACT: ICD-10-CM

## 2023-03-20 DIAGNOSIS — Z90.49 ACQUIRED ABSENCE OF OTHER SPECIFIED PARTS OF DIGESTIVE TRACT: Chronic | ICD-10-CM

## 2023-03-20 DIAGNOSIS — Z3A.08 8 WEEKS GESTATION OF PREGNANCY: ICD-10-CM

## 2023-03-20 DIAGNOSIS — O99.611 DISEASES OF THE DIGESTIVE SYSTEM COMPLICATING PREGNANCY, FIRST TRIMESTER: ICD-10-CM

## 2023-03-20 DIAGNOSIS — Z20.822 CONTACT WITH AND (SUSPECTED) EXPOSURE TO COVID-19: ICD-10-CM

## 2023-03-20 DIAGNOSIS — Z79.84 LONG TERM (CURRENT) USE OF ORAL HYPOGLYCEMIC DRUGS: ICD-10-CM

## 2023-03-20 DIAGNOSIS — O99.281 ENDOCRINE, NUTRITIONAL AND METABOLIC DISEASES COMPLICATING PREGNANCY, FIRST TRIMESTER: ICD-10-CM

## 2023-03-20 LAB
ALBUMIN SERPL ELPH-MCNC: 3.3 G/DL — SIGNIFICANT CHANGE UP (ref 3.3–5)
ALP SERPL-CCNC: 49 U/L — SIGNIFICANT CHANGE UP (ref 40–120)
ALT FLD-CCNC: 64 U/L — SIGNIFICANT CHANGE UP (ref 12–78)
ANION GAP SERPL CALC-SCNC: 7 MMOL/L — SIGNIFICANT CHANGE UP (ref 5–17)
APPEARANCE UR: ABNORMAL
AST SERPL-CCNC: 30 U/L — SIGNIFICANT CHANGE UP (ref 15–37)
BACTERIA # UR AUTO: ABNORMAL
BASOPHILS # BLD AUTO: 0.04 K/UL — SIGNIFICANT CHANGE UP (ref 0–0.2)
BASOPHILS NFR BLD AUTO: 0.4 % — SIGNIFICANT CHANGE UP (ref 0–2)
BILIRUB SERPL-MCNC: 0.3 MG/DL — SIGNIFICANT CHANGE UP (ref 0.2–1.2)
BILIRUB UR-MCNC: NEGATIVE — SIGNIFICANT CHANGE UP
BLD GP AB SCN SERPL QL: SIGNIFICANT CHANGE UP
BUN SERPL-MCNC: 7 MG/DL — SIGNIFICANT CHANGE UP (ref 7–23)
CALCIUM SERPL-MCNC: 9.2 MG/DL — SIGNIFICANT CHANGE UP (ref 8.5–10.1)
CHLORIDE SERPL-SCNC: 106 MMOL/L — SIGNIFICANT CHANGE UP (ref 96–108)
CO2 SERPL-SCNC: 22 MMOL/L — SIGNIFICANT CHANGE UP (ref 22–31)
COLOR SPEC: YELLOW — SIGNIFICANT CHANGE UP
CREAT SERPL-MCNC: 0.57 MG/DL — SIGNIFICANT CHANGE UP (ref 0.5–1.3)
DIFF PNL FLD: ABNORMAL
EGFR: 123 ML/MIN/1.73M2 — SIGNIFICANT CHANGE UP
EOSINOPHIL # BLD AUTO: 0.04 K/UL — SIGNIFICANT CHANGE UP (ref 0–0.5)
EOSINOPHIL NFR BLD AUTO: 0.4 % — SIGNIFICANT CHANGE UP (ref 0–6)
EPI CELLS # UR: ABNORMAL
GLUCOSE SERPL-MCNC: 108 MG/DL — HIGH (ref 70–99)
GLUCOSE UR QL: NEGATIVE — SIGNIFICANT CHANGE UP
HCG SERPL-ACNC: HIGH MIU/ML
HCT VFR BLD CALC: 37.5 % — SIGNIFICANT CHANGE UP (ref 34.5–45)
HGB BLD-MCNC: 13 G/DL — SIGNIFICANT CHANGE UP (ref 11.5–15.5)
IMM GRANULOCYTES NFR BLD AUTO: 0.3 % — SIGNIFICANT CHANGE UP (ref 0–0.9)
KETONES UR-MCNC: ABNORMAL
LEUKOCYTE ESTERASE UR-ACNC: NEGATIVE — SIGNIFICANT CHANGE UP
LYMPHOCYTES # BLD AUTO: 2.56 K/UL — SIGNIFICANT CHANGE UP (ref 1–3.3)
LYMPHOCYTES # BLD AUTO: 23.7 % — SIGNIFICANT CHANGE UP (ref 13–44)
MCHC RBC-ENTMCNC: 28.6 PG — SIGNIFICANT CHANGE UP (ref 27–34)
MCHC RBC-ENTMCNC: 34.7 GM/DL — SIGNIFICANT CHANGE UP (ref 32–36)
MCV RBC AUTO: 82.4 FL — SIGNIFICANT CHANGE UP (ref 80–100)
MONOCYTES # BLD AUTO: 0.69 K/UL — SIGNIFICANT CHANGE UP (ref 0–0.9)
MONOCYTES NFR BLD AUTO: 6.4 % — SIGNIFICANT CHANGE UP (ref 2–14)
NEUTROPHILS # BLD AUTO: 7.43 K/UL — HIGH (ref 1.8–7.4)
NEUTROPHILS NFR BLD AUTO: 68.8 % — SIGNIFICANT CHANGE UP (ref 43–77)
NITRITE UR-MCNC: NEGATIVE — SIGNIFICANT CHANGE UP
PH UR: 6 — SIGNIFICANT CHANGE UP (ref 5–8)
PLATELET # BLD AUTO: 265 K/UL — SIGNIFICANT CHANGE UP (ref 150–400)
POTASSIUM SERPL-MCNC: 3.6 MMOL/L — SIGNIFICANT CHANGE UP (ref 3.5–5.3)
POTASSIUM SERPL-SCNC: 3.6 MMOL/L — SIGNIFICANT CHANGE UP (ref 3.5–5.3)
PROT SERPL-MCNC: 7.7 GM/DL — SIGNIFICANT CHANGE UP (ref 6–8.3)
PROT UR-MCNC: 15
RBC # BLD: 4.55 M/UL — SIGNIFICANT CHANGE UP (ref 3.8–5.2)
RBC # FLD: 12.9 % — SIGNIFICANT CHANGE UP (ref 10.3–14.5)
RBC CASTS # UR COMP ASSIST: SIGNIFICANT CHANGE UP /HPF (ref 0–4)
SARS-COV-2 RNA SPEC QL NAA+PROBE: SIGNIFICANT CHANGE UP
SODIUM SERPL-SCNC: 135 MMOL/L — SIGNIFICANT CHANGE UP (ref 135–145)
SP GR SPEC: 1.02 — SIGNIFICANT CHANGE UP (ref 1.01–1.02)
UROBILINOGEN FLD QL: 1
WBC # BLD: 10.79 K/UL — HIGH (ref 3.8–10.5)
WBC # FLD AUTO: 10.79 K/UL — HIGH (ref 3.8–10.5)
WBC UR QL: NEGATIVE /HPF — SIGNIFICANT CHANGE UP (ref 0–5)

## 2023-03-20 PROCEDURE — 76817 TRANSVAGINAL US OBSTETRIC: CPT

## 2023-03-20 PROCEDURE — 86901 BLOOD TYPING SEROLOGIC RH(D): CPT

## 2023-03-20 PROCEDURE — 99285 EMERGENCY DEPT VISIT HI MDM: CPT

## 2023-03-20 PROCEDURE — 87635 SARS-COV-2 COVID-19 AMP PRB: CPT

## 2023-03-20 PROCEDURE — 84702 CHORIONIC GONADOTROPIN TEST: CPT

## 2023-03-20 PROCEDURE — 99284 EMERGENCY DEPT VISIT MOD MDM: CPT | Mod: 25

## 2023-03-20 PROCEDURE — 81001 URINALYSIS AUTO W/SCOPE: CPT

## 2023-03-20 PROCEDURE — 36415 COLL VENOUS BLD VENIPUNCTURE: CPT

## 2023-03-20 PROCEDURE — 80053 COMPREHEN METABOLIC PANEL: CPT

## 2023-03-20 PROCEDURE — 85025 COMPLETE CBC W/AUTO DIFF WBC: CPT

## 2023-03-20 PROCEDURE — 96374 THER/PROPH/DIAG INJ IV PUSH: CPT

## 2023-03-20 PROCEDURE — 87086 URINE CULTURE/COLONY COUNT: CPT

## 2023-03-20 PROCEDURE — 86850 RBC ANTIBODY SCREEN: CPT

## 2023-03-20 PROCEDURE — 86900 BLOOD TYPING SEROLOGIC ABO: CPT

## 2023-03-20 PROCEDURE — 76817 TRANSVAGINAL US OBSTETRIC: CPT | Mod: 26

## 2023-03-20 RX ORDER — ACETAMINOPHEN 500 MG
1000 TABLET ORAL ONCE
Refills: 0 | Status: COMPLETED | OUTPATIENT
Start: 2023-03-20 | End: 2023-03-20

## 2023-03-20 RX ORDER — POLYETHYLENE GLYCOL 3350 17 G/17G
17 POWDER, FOR SOLUTION ORAL ONCE
Refills: 0 | Status: COMPLETED | OUTPATIENT
Start: 2023-03-20 | End: 2023-03-20

## 2023-03-20 RX ORDER — SODIUM CHLORIDE 9 MG/ML
2000 INJECTION INTRAMUSCULAR; INTRAVENOUS; SUBCUTANEOUS ONCE
Refills: 0 | Status: COMPLETED | OUTPATIENT
Start: 2023-03-20 | End: 2023-03-20

## 2023-03-20 RX ADMIN — SODIUM CHLORIDE 2000 MILLILITER(S): 9 INJECTION INTRAMUSCULAR; INTRAVENOUS; SUBCUTANEOUS at 17:39

## 2023-03-20 RX ADMIN — POLYETHYLENE GLYCOL 3350 17 GRAM(S): 17 POWDER, FOR SOLUTION ORAL at 15:35

## 2023-03-20 RX ADMIN — Medication 400 MILLIGRAM(S): at 15:35

## 2023-03-20 NOTE — ED PROVIDER NOTE - CLINICAL SUMMARY MEDICAL DECISION MAKING FREE TEXT BOX
32 year old female, , presents with abdominal pain in pregnancy. Pt hasn't had BM in 3 days, with only a little this morning. Likely constipation; however, can't rule out obstruction or OB issue. Plan: Sonogram, blood work, medication, OB consultation, and reassess closely.

## 2023-03-20 NOTE — ED ADULT TRIAGE NOTE - CHIEF COMPLAINT QUOTE
Complaining of severe abdominal pain 1130AM. 8 weeks pregnant, denies any vaginal bleeding or discharge. 2nd pregnancy, first was vaginal delivery with no vomplications.  OBGYN Arm[place]. Unable to stand at triage due to extreme weakness and pain, moaning and crying in pain, appears pale, placed in wheelchair and taken to main for bed placement.

## 2023-03-20 NOTE — ED ADULT TRIAGE NOTE - STATUS:
[TextBox_13] : Referred by Dr. Steve Wynne.\par \par Mr. FRANCIS is a 58 year old male with a history of COPD.\par \par He was seen in the office today for review of eligibility for, as well as, Low-Dose CT lung cancer screening.  Reviewed and confirmed that the patient meets screening eligibility criteria:\par \par 58 years old \par \par Smoking Status: Former smoker\par \par Number of pack(s) per day: 2 ppd x 5 years, 1 ppd x 26 years\par Number of years smoked: 31\par Number of pack years smokin\par Also recently quit smoking cigars, 1 cigar daily x 1 year\par \par Number of years since quitting smokin\par Quit year: \par \par Mr. FRANCIS denies any symptoms of lung cancer, including new cough, change in cough, hemoptysis, and unintentional weight loss.\par \par Mr. FRANCIS denies any personal history of lung cancer.  No lung cancer in a first degree relative.  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

## 2023-03-20 NOTE — ED PROVIDER NOTE - PATIENT PORTAL LINK FT
You can access the FollowMyHealth Patient Portal offered by [facility] by registering at the following website: [email or web address]. By joining [employer]’s FollowMyHealth portal, you will also be able to view your health information using other applications (apps) compatible with our system.

## 2023-03-20 NOTE — ED PROVIDER NOTE - PROGRESS NOTE DETAILS
Carl Quiroz for attending Dr. Anirudh SWENSON consulted PT TOLERATING PO INTAKE , FEELS BETTER HAS TWO BOWEL MOVEMENTSI N ED. TOLD BOUT PERIGESTATIONAL HEMATOMA AND NEED TO F/U WITH ASHFAW HER OB    ED evaluation and management discussed with the patient and family (if available) in detail.  Close PMD follow up encouraged.  Strict ED return instructions discussed in detail and patient given the opportunity to ask any questions about their discharge diagnosis and instructions. Patient verbalized understanding.

## 2023-03-20 NOTE — ED PROVIDER NOTE - NS ED ROS FT
Constitutional: No fever or chills  Eyes: No visual changes  HEENT: No throat pain  CV: No chest pain  Resp: No SOB no cough  GI: + abdominal pain, nausea, vomiting, and constipation  : No dysuria  MSK: No musculoskeletal pain  Skin: No rash  Neuro: No headache

## 2023-03-20 NOTE — CHART NOTE - NSCHARTNOTEFT_GEN_A_CORE
Patient is a 31yo  @ 8w6d by [facility] who presents to the ED for abdominal pain. Describes pain as severe, sharp, and like "something is moving through her abdomen." Pain improved after bowel movement. Patient denies cramping abdominal pain and vaginal bleeding. Currently denies fevers, chills, nausea, vomiting. PNC with Dr. Huffman.    ICU Vital Signs Last 24 Hrs  T(C): 36.5 (20 Mar 2023 14:39), Max: 36.5 (20 Mar 2023 14:39)  T(F): 97.7 (20 Mar 2023 14:39), Max: 97.7 (20 Mar 2023 14:39)  HR: 64 (20 Mar 2023 14:39) (64 - 64)  BP: 126/81 (20 Mar 2023 14:39) (126/81 - 126/81)  RR: 18 (20 Mar 2023 14:39) (18 - 18)  SpO2: 100% (20 Mar 2023 14:39) (100% - 100%)    Vitals:   T(C): 36.5 (23 @ 14:39), Max: 36.5 (-23 @ 14:39)  HR: 64 (-23 @ 14:39) (64 - 64)  BP: 126/81 (-23 @ 14:39) (126/81 - 126/81)  RR: 18 (23 @ 14:39) (18 - 18)  SpO2: 100% (23 @ 14:39) (100% - 100%)    General: AAOx3, NAD  Abd: Soft, nontender, non distended  Pelvic: deferred    Labs:                        13.0   10.79 )-----------( 265      ( 20 Mar 2023 14:58 )             37.5         135  |  106  |  7   ----------------------------<  108<H>  3.6   |  22  |  0.57    Ca    9.2      20 Mar 2023 14:58    TPro  7.7  /  Alb  3.3  /  TBili  0.3  /  DBili  x   /  AST  30  /  ALT  64  /  AlkPhos  49  03-    SERUM bhcg    [id number]  03-20 @ 14:58    Radiology:  < from: US Transvaginal, OB (23 @ 16:31) >    IMPRESSION:  Single live intrauterine gestation.    Small perigestational hematoma.  Estimated gestational age of 8 weeks and 6 days  Estimated due date of  10/24/2023      A/P: Patient is a 31yo  @ 8w6d by [facility] who presents to the ED for abdominal pain that resolved post BM. TVUS showing normal IUP with small perigestational hematoma.    - VS wnl, labs wnl, no obstetric complaints, with normal TVUS findings.  - Patient counselled that perigestational hematoma is a common finding in early pregnancy that may be associated with vaginal spotting but otherwise benign.  - Pain likely 2/2 constipation. Patient instructed to PO hydrate and c/w miralax PRN for bowel regularity.   - Patient cleared from obstetric perspective. To c/w routine PNC and to call office if she experiences vaginal bleeding, contraction like abdominal pain, or fever > 100.4F.    D/w Dr. Hamilton

## 2023-03-20 NOTE — ED ADULT NURSE NOTE - OBJECTIVE STATEMENT
Pt A&Ox4, presents to the ED c/o intermittent abdominal pain and n/v since 11:30am. Pt states "it feels the gas pain." Pt had BM 1 hour PTA, described as small and hard. Pt is 8 weeks pregnant. Denies vaginal bleeding or discharge. No medication PTA.

## 2023-03-20 NOTE — ED PROVIDER NOTE - OBJECTIVE STATEMENT
32 year old female, , currently 8 weeks pregnant, with PMHx of cholecystectomy presents to the ED complaining of sudden onset diffuse shooting abdominal pain x 11:30am. Pt endorses constipation for past 3 days with small BM this morning. Pt endorses associated vomiting and sweating. Denies any vaginal bleeding. Pt endorses taking prenatal vitamins and Zofran in pregnancy. Denies any history of bowel obstruction. LMP in January.

## 2023-03-20 NOTE — ED ADULT TRIAGE NOTE - HEIGHT IN FEET
Demian is a 69 year old who is being evaluated via a billable telephone visit.      What phone number would you like to be contacted at? [phone]  How would you like to obtain your AVS? Grover Lund   Demian is a 69 year old who presents for the following health issues     HPI     Medication Followup of Tramadol, Fibromyalgia    Taking Medication as prescribed: NO    Side Effects:  None    Medication Helping Symptoms:  NO     Generalized. Body pain. Mostly upper arms lately and back.   Reviewed work up from feb.  Query simvastatin as a culprit   Review of Systems   Constitutional, HEENT, cardiovascular, pulmonary, GI, , musculoskeletal, neuro, skin, endocrine and psych systems are negative, except as otherwise noted.      Objective           Vitals:  No vitals were obtained today due to virtual visit.    Physical Exam   healthy, alert and no distress  PSYCH: Alert and oriented times 3; coherent speech, normal   rate and volume, able to articulate logical thoughts, able   to abstract reason, no tangential thoughts, no hallucinations   or delusions  His affect is normal  RESP: No cough, no audible wheezing, able to talk in full sentences  Remainder of exam unable to be completed due to telephone visits    Prashant was seen today for fibromyalgia.    Diagnoses and all orders for this visit:    Generalized body aches    Fibromyalgia  -     gabapentin (NEURONTIN) 300 MG capsule; Take 2 capsules (600 mg) by mouth 3 times daily  -     traMADol (ULTRAM) 50 MG tablet; Take 2 tablets (100 mg) by mouth 3 times daily as needed for severe pain      Stop simvastatin (trial off of it)  Increase tramadol to 2 tabs tid.  Advised supportive and symptomatic treatment.  Follow up with Provider - if condition persists or worsens.           Phone call duration: 16 minutes   5

## 2023-03-20 NOTE — ED PROVIDER NOTE - PHYSICAL EXAMINATION
Constitutional: Uncomfortable appearing female, AAOx3  Eyes: PERRLA EOMI  Head: Normocephalic atraumatic  Mouth: MMM  Cardiac: regular rate   Resp: Lungs CTAB  GI: Abdomen soft, non-tender, hyperactive bowel sounds, minimal distension  Neuro: awake, alert, moving all extremities, cranial nerves 2-12 intact, sensation intact, no dysmetria.  Skin: No rashes

## 2023-03-20 NOTE — ED ADULT NURSE NOTE - SUICIDE SCREENING QUESTION 3
Patient would like to speak to a nurse regarding discharge on his LT leg. Sx was on 08/20/22. Patient is concerned of possible infection. Please advise. No

## 2023-03-21 LAB
CULTURE RESULTS: SIGNIFICANT CHANGE UP
SPECIMEN SOURCE: SIGNIFICANT CHANGE UP

## 2023-04-17 ENCOUNTER — ASOB RESULT (OUTPATIENT)
Age: 33
End: 2023-04-17

## 2023-04-17 ENCOUNTER — APPOINTMENT (OUTPATIENT)
Dept: ANTEPARTUM | Facility: CLINIC | Age: 33
End: 2023-04-17
Payer: COMMERCIAL

## 2023-04-17 DIAGNOSIS — O35.9XX0 MATERNAL CARE FOR (SUSPECTED) FETAL ABNORMALITY AND DAMAGE, UNSPECIFIED, NOT APPLICABLE OR UNSPECIFIED: ICD-10-CM

## 2023-04-17 PROCEDURE — 36416 COLLJ CAPILLARY BLOOD SPEC: CPT

## 2023-04-17 PROCEDURE — 76813 OB US NUCHAL MEAS 1 GEST: CPT

## 2023-04-20 LAB
ADDITIONAL US: NORMAL
COMMENTS: AFP: NORMAL
CRL SCAN TWIN B: NORMAL
CRL SCAN: NORMAL
CROWN RUMP LENGTH TWIN B: NORMAL
CROWN RUMP LENGTH: 65.7 MM
DOWN SYNDROME AGE RISK: NORMAL
DOWN SYNDROME INTERPRETATION: NORMAL
DOWN SYNDROME SCREENING RISK: NORMAL
GEST. AGE ON COLLECTION DATE: 12.7 WEEKS
HCG MOM: 0.94
HCG VALUE: 93.2 IU/ML
MATERNAL AGE AT EDD: 34 YR
NOTE: AFP: NORMAL
NT MOM TWIN B: NORMAL
NT TWIN B: NORMAL
NUCHAL TRANSLUCENCY (NT): 1.6 MM
NUCHAL TRANSLUCENCY MOM: 0.94
NUMBER OF FETUSES: 1
PAPP-A MOM: 0.71
PAPP-A VALUE: 851 NG/ML
RACE: NORMAL
RESULTS AFP: NORMAL
SONOGRAPHER ID#: NORMAL
SUBMIT PART 2 SAMPLE USING: NORMAL
TEST RESULTS: AFP: NORMAL
TRISOMY 18 AGE RISK: NORMAL
TRISOMY 18 INTERPRETATION: NORMAL
TRISOMY 18 SCREENING RISK: NORMAL
WEIGHT AFP: 140 LBS

## 2023-06-05 ENCOUNTER — ASOB RESULT (OUTPATIENT)
Age: 33
End: 2023-06-05

## 2023-06-05 ENCOUNTER — APPOINTMENT (OUTPATIENT)
Dept: ANTEPARTUM | Facility: CLINIC | Age: 33
End: 2023-06-05
Payer: COMMERCIAL

## 2023-06-05 PROCEDURE — 76805 OB US >/= 14 WKS SNGL FETUS: CPT

## 2023-06-05 PROCEDURE — 76817 TRANSVAGINAL US OBSTETRIC: CPT

## 2023-08-15 ENCOUNTER — ASOB RESULT (OUTPATIENT)
Age: 33
End: 2023-08-15

## 2023-08-15 ENCOUNTER — APPOINTMENT (OUTPATIENT)
Dept: ANTEPARTUM | Facility: CLINIC | Age: 33
End: 2023-08-15
Payer: COMMERCIAL

## 2023-08-15 PROCEDURE — 76816 OB US FOLLOW-UP PER FETUS: CPT

## 2023-09-26 ENCOUNTER — APPOINTMENT (OUTPATIENT)
Dept: ANTEPARTUM | Facility: CLINIC | Age: 33
End: 2023-09-26
Payer: COMMERCIAL

## 2023-09-26 ENCOUNTER — ASOB RESULT (OUTPATIENT)
Age: 33
End: 2023-09-26

## 2023-09-26 PROCEDURE — 76816 OB US FOLLOW-UP PER FETUS: CPT

## 2023-10-03 ENCOUNTER — APPOINTMENT (OUTPATIENT)
Dept: ANTEPARTUM | Facility: CLINIC | Age: 33
End: 2023-10-03

## 2023-10-03 NOTE — ED ADULT NURSE NOTE - HISTORY OF COVID-19 VACCINATION
Patient would like to speak with Dietician prior to scheduling appt. Please advise.   Vaccine status unknown

## 2023-10-20 ENCOUNTER — INPATIENT (INPATIENT)
Facility: HOSPITAL | Age: 33
LOS: 2 days | Discharge: HOME | End: 2023-10-23
Attending: OBSTETRICS & GYNECOLOGY | Admitting: OBSTETRICS & GYNECOLOGY
Payer: COMMERCIAL

## 2023-10-20 VITALS — HEIGHT: 64 IN | WEIGHT: 166.01 LBS

## 2023-10-20 DIAGNOSIS — O26.899 OTHER SPECIFIED PREGNANCY RELATED CONDITIONS, UNSPECIFIED TRIMESTER: ICD-10-CM

## 2023-10-20 DIAGNOSIS — Z90.49 ACQUIRED ABSENCE OF OTHER SPECIFIED PARTS OF DIGESTIVE TRACT: Chronic | ICD-10-CM

## 2023-10-20 DIAGNOSIS — Z3A.00 WEEKS OF GESTATION OF PREGNANCY NOT SPECIFIED: ICD-10-CM

## 2023-10-20 LAB
BASOPHILS # BLD AUTO: 0.04 K/UL — SIGNIFICANT CHANGE UP (ref 0–0.2)
BASOPHILS # BLD AUTO: 0.04 K/UL — SIGNIFICANT CHANGE UP (ref 0–0.2)
BASOPHILS NFR BLD AUTO: 0.3 % — SIGNIFICANT CHANGE UP (ref 0–2)
BASOPHILS NFR BLD AUTO: 0.3 % — SIGNIFICANT CHANGE UP (ref 0–2)
EOSINOPHIL # BLD AUTO: 0.08 K/UL — SIGNIFICANT CHANGE UP (ref 0–0.5)
EOSINOPHIL # BLD AUTO: 0.08 K/UL — SIGNIFICANT CHANGE UP (ref 0–0.5)
EOSINOPHIL NFR BLD AUTO: 0.7 % — SIGNIFICANT CHANGE UP (ref 0–6)
EOSINOPHIL NFR BLD AUTO: 0.7 % — SIGNIFICANT CHANGE UP (ref 0–6)
HCT VFR BLD CALC: 33.7 % — LOW (ref 34.5–45)
HCT VFR BLD CALC: 33.7 % — LOW (ref 34.5–45)
HGB BLD-MCNC: 10.9 G/DL — LOW (ref 11.5–15.5)
HGB BLD-MCNC: 10.9 G/DL — LOW (ref 11.5–15.5)
IMM GRANULOCYTES NFR BLD AUTO: 0.3 % — SIGNIFICANT CHANGE UP (ref 0–0.9)
IMM GRANULOCYTES NFR BLD AUTO: 0.3 % — SIGNIFICANT CHANGE UP (ref 0–0.9)
LYMPHOCYTES # BLD AUTO: 1.94 K/UL — SIGNIFICANT CHANGE UP (ref 1–3.3)
LYMPHOCYTES # BLD AUTO: 1.94 K/UL — SIGNIFICANT CHANGE UP (ref 1–3.3)
LYMPHOCYTES # BLD AUTO: 16.9 % — SIGNIFICANT CHANGE UP (ref 13–44)
LYMPHOCYTES # BLD AUTO: 16.9 % — SIGNIFICANT CHANGE UP (ref 13–44)
MCHC RBC-ENTMCNC: 25.3 PG — LOW (ref 27–34)
MCHC RBC-ENTMCNC: 25.3 PG — LOW (ref 27–34)
MCHC RBC-ENTMCNC: 32.3 GM/DL — SIGNIFICANT CHANGE UP (ref 32–36)
MCHC RBC-ENTMCNC: 32.3 GM/DL — SIGNIFICANT CHANGE UP (ref 32–36)
MCV RBC AUTO: 78.2 FL — LOW (ref 80–100)
MCV RBC AUTO: 78.2 FL — LOW (ref 80–100)
MONOCYTES # BLD AUTO: 0.64 K/UL — SIGNIFICANT CHANGE UP (ref 0–0.9)
MONOCYTES # BLD AUTO: 0.64 K/UL — SIGNIFICANT CHANGE UP (ref 0–0.9)
MONOCYTES NFR BLD AUTO: 5.6 % — SIGNIFICANT CHANGE UP (ref 2–14)
MONOCYTES NFR BLD AUTO: 5.6 % — SIGNIFICANT CHANGE UP (ref 2–14)
NEUTROPHILS # BLD AUTO: 8.71 K/UL — HIGH (ref 1.8–7.4)
NEUTROPHILS # BLD AUTO: 8.71 K/UL — HIGH (ref 1.8–7.4)
NEUTROPHILS NFR BLD AUTO: 76.2 % — SIGNIFICANT CHANGE UP (ref 43–77)
NEUTROPHILS NFR BLD AUTO: 76.2 % — SIGNIFICANT CHANGE UP (ref 43–77)
PLATELET # BLD AUTO: 232 K/UL — SIGNIFICANT CHANGE UP (ref 150–400)
PLATELET # BLD AUTO: 232 K/UL — SIGNIFICANT CHANGE UP (ref 150–400)
RBC # BLD: 4.31 M/UL — SIGNIFICANT CHANGE UP (ref 3.8–5.2)
RBC # BLD: 4.31 M/UL — SIGNIFICANT CHANGE UP (ref 3.8–5.2)
RBC # FLD: 15.3 % — HIGH (ref 10.3–14.5)
RBC # FLD: 15.3 % — HIGH (ref 10.3–14.5)
WBC # BLD: 11.45 K/UL — HIGH (ref 3.8–10.5)
WBC # BLD: 11.45 K/UL — HIGH (ref 3.8–10.5)
WBC # FLD AUTO: 11.45 K/UL — HIGH (ref 3.8–10.5)
WBC # FLD AUTO: 11.45 K/UL — HIGH (ref 3.8–10.5)

## 2023-10-20 PROCEDURE — 83615 LACTATE (LD) (LDH) ENZYME: CPT

## 2023-10-20 PROCEDURE — 86850 RBC ANTIBODY SCREEN: CPT

## 2023-10-20 PROCEDURE — 85610 PROTHROMBIN TIME: CPT

## 2023-10-20 PROCEDURE — 86900 BLOOD TYPING SEROLOGIC ABO: CPT

## 2023-10-20 PROCEDURE — 99214 OFFICE O/P EST MOD 30 MIN: CPT

## 2023-10-20 PROCEDURE — 85027 COMPLETE CBC AUTOMATED: CPT

## 2023-10-20 PROCEDURE — 94760 N-INVAS EAR/PLS OXIMETRY 1: CPT

## 2023-10-20 PROCEDURE — 59050 FETAL MONITOR W/REPORT: CPT

## 2023-10-20 PROCEDURE — 85018 HEMOGLOBIN: CPT

## 2023-10-20 PROCEDURE — 84550 ASSAY OF BLOOD/URIC ACID: CPT

## 2023-10-20 PROCEDURE — 86901 BLOOD TYPING SEROLOGIC RH(D): CPT

## 2023-10-20 PROCEDURE — 85730 THROMBOPLASTIN TIME PARTIAL: CPT

## 2023-10-20 PROCEDURE — 86780 TREPONEMA PALLIDUM: CPT

## 2023-10-20 PROCEDURE — 85025 COMPLETE CBC W/AUTO DIFF WBC: CPT

## 2023-10-20 PROCEDURE — 85014 HEMATOCRIT: CPT

## 2023-10-20 PROCEDURE — C1889: CPT

## 2023-10-20 PROCEDURE — 80053 COMPREHEN METABOLIC PANEL: CPT

## 2023-10-20 PROCEDURE — 36415 COLL VENOUS BLD VENIPUNCTURE: CPT

## 2023-10-20 PROCEDURE — 85384 FIBRINOGEN ACTIVITY: CPT

## 2023-10-20 RX ORDER — CITRIC ACID/SODIUM CITRATE 300-500 MG
30 SOLUTION, ORAL ORAL ONCE
Refills: 0 | Status: DISCONTINUED | OUTPATIENT
Start: 2023-10-20 | End: 2023-10-21

## 2023-10-20 RX ORDER — OXYTOCIN 10 UNIT/ML
333.33 VIAL (ML) INJECTION
Qty: 20 | Refills: 0 | Status: COMPLETED | OUTPATIENT
Start: 2023-10-20 | End: 2023-10-21

## 2023-10-20 RX ORDER — SODIUM CHLORIDE 9 MG/ML
1000 INJECTION, SOLUTION INTRAVENOUS
Refills: 0 | Status: DISCONTINUED | OUTPATIENT
Start: 2023-10-20 | End: 2023-10-21

## 2023-10-20 RX ORDER — CHLORHEXIDINE GLUCONATE 213 G/1000ML
1 SOLUTION TOPICAL DAILY
Refills: 0 | Status: DISCONTINUED | OUTPATIENT
Start: 2023-10-20 | End: 2023-10-21

## 2023-10-20 NOTE — PATIENT PROFILE OB - POST PARTUM DEPRESSION SCREEN OB 4
Assessment & Plan         Left ear pressure ; Allergic rhinitis    Restart Dymista nasal spray    Observation, if no improvement, pt will see ENT    Marilyn Tee MD  [facility]    Sofía Kevin is a 17 year old who presents for the following health issues  accompanied by his mother.    HPI     Concern - Follow up on Asthma,  and ear pressure in left ear. Has tried some ear drops with no relief. It started a month ago when pt started swimming. He has stopped using nasal spray recently, otherwise taking all of his asthma meds.        Review of Systems   Constitutional, HEENT, cardiovascular, pulmonary, gi and gu systems are negative, except as otherwise noted.      Objective    /75   Pulse 99   Temp 97.6  F (36.4  C) (Tympanic)   Wt 144 lb 4 oz (65.4 kg)   SpO2 98%   BMI 21.61 kg/m    Body mass index is 21.61 kg/m .  Physical Exam   GENERAL: healthy, alert and no distress  EYES: Eyes grossly normal to inspection, PERRL and conjunctivae and sclerae normal  HENT: ear canals and TM's normal, nose and mouth without ulcers or lesions  NECK: no adenopathy, no asymmetry, masses, or scars and thyroid normal to palpation  RESP: lungs clear to auscultation - no rales, rhonchi or wheezes  CV: regular rate and rhythm, normal S1 S2, no S3 or S4, no murmur, click or rub, no peripheral edema and peripheral pulses strong  MS: no gross musculoskeletal defects noted, no edema  SKIN: no suspicious lesions or rashes  NEURO: Normal strength and tone, mentation intact and speech normal  PSYCH: mentation appears normal, affect normal/bright           no

## 2023-10-21 LAB
ALBUMIN SERPL ELPH-MCNC: 2.2 G/DL — LOW (ref 3.3–5)
ALBUMIN SERPL ELPH-MCNC: 2.2 G/DL — LOW (ref 3.3–5)
ALP SERPL-CCNC: 140 U/L — HIGH (ref 40–120)
ALP SERPL-CCNC: 140 U/L — HIGH (ref 40–120)
ALT FLD-CCNC: 9 U/L — LOW (ref 12–78)
ALT FLD-CCNC: 9 U/L — LOW (ref 12–78)
ANION GAP SERPL CALC-SCNC: 9 MMOL/L — SIGNIFICANT CHANGE UP (ref 5–17)
ANION GAP SERPL CALC-SCNC: 9 MMOL/L — SIGNIFICANT CHANGE UP (ref 5–17)
APTT BLD: 26.9 SEC — SIGNIFICANT CHANGE UP (ref 24.5–35.6)
APTT BLD: 26.9 SEC — SIGNIFICANT CHANGE UP (ref 24.5–35.6)
AST SERPL-CCNC: 14 U/L — LOW (ref 15–37)
AST SERPL-CCNC: 14 U/L — LOW (ref 15–37)
BASOPHILS # BLD AUTO: 0.03 K/UL — SIGNIFICANT CHANGE UP (ref 0–0.2)
BASOPHILS # BLD AUTO: 0.03 K/UL — SIGNIFICANT CHANGE UP (ref 0–0.2)
BASOPHILS NFR BLD AUTO: 0.2 % — SIGNIFICANT CHANGE UP (ref 0–2)
BASOPHILS NFR BLD AUTO: 0.2 % — SIGNIFICANT CHANGE UP (ref 0–2)
BILIRUB SERPL-MCNC: 0.3 MG/DL — SIGNIFICANT CHANGE UP (ref 0.2–1.2)
BILIRUB SERPL-MCNC: 0.3 MG/DL — SIGNIFICANT CHANGE UP (ref 0.2–1.2)
BUN SERPL-MCNC: 4 MG/DL — LOW (ref 7–23)
BUN SERPL-MCNC: 4 MG/DL — LOW (ref 7–23)
CALCIUM SERPL-MCNC: 8.9 MG/DL — SIGNIFICANT CHANGE UP (ref 8.5–10.1)
CALCIUM SERPL-MCNC: 8.9 MG/DL — SIGNIFICANT CHANGE UP (ref 8.5–10.1)
CHLORIDE SERPL-SCNC: 108 MMOL/L — SIGNIFICANT CHANGE UP (ref 96–108)
CHLORIDE SERPL-SCNC: 108 MMOL/L — SIGNIFICANT CHANGE UP (ref 96–108)
CO2 SERPL-SCNC: 21 MMOL/L — LOW (ref 22–31)
CO2 SERPL-SCNC: 21 MMOL/L — LOW (ref 22–31)
CREAT SERPL-MCNC: 0.51 MG/DL — SIGNIFICANT CHANGE UP (ref 0.5–1.3)
CREAT SERPL-MCNC: 0.51 MG/DL — SIGNIFICANT CHANGE UP (ref 0.5–1.3)
EGFR: 126 ML/MIN/1.73M2 — SIGNIFICANT CHANGE UP
EGFR: 126 ML/MIN/1.73M2 — SIGNIFICANT CHANGE UP
EOSINOPHIL # BLD AUTO: 0.01 K/UL — SIGNIFICANT CHANGE UP (ref 0–0.5)
EOSINOPHIL # BLD AUTO: 0.01 K/UL — SIGNIFICANT CHANGE UP (ref 0–0.5)
EOSINOPHIL NFR BLD AUTO: 0.1 % — SIGNIFICANT CHANGE UP (ref 0–6)
EOSINOPHIL NFR BLD AUTO: 0.1 % — SIGNIFICANT CHANGE UP (ref 0–6)
FIBRINOGEN PPP-MCNC: 416 MG/DL — SIGNIFICANT CHANGE UP (ref 200–435)
FIBRINOGEN PPP-MCNC: 416 MG/DL — SIGNIFICANT CHANGE UP (ref 200–435)
GLUCOSE SERPL-MCNC: 107 MG/DL — HIGH (ref 70–99)
GLUCOSE SERPL-MCNC: 107 MG/DL — HIGH (ref 70–99)
HCT VFR BLD CALC: 32.6 % — LOW (ref 34.5–45)
HCT VFR BLD CALC: 32.6 % — LOW (ref 34.5–45)
HGB BLD-MCNC: 10.5 G/DL — LOW (ref 11.5–15.5)
HGB BLD-MCNC: 10.5 G/DL — LOW (ref 11.5–15.5)
IMM GRANULOCYTES NFR BLD AUTO: 0.5 % — SIGNIFICANT CHANGE UP (ref 0–0.9)
IMM GRANULOCYTES NFR BLD AUTO: 0.5 % — SIGNIFICANT CHANGE UP (ref 0–0.9)
INR BLD: 0.92 RATIO — SIGNIFICANT CHANGE UP (ref 0.85–1.18)
INR BLD: 0.92 RATIO — SIGNIFICANT CHANGE UP (ref 0.85–1.18)
LDH SERPL L TO P-CCNC: 157 U/L — SIGNIFICANT CHANGE UP (ref 84–241)
LDH SERPL L TO P-CCNC: 157 U/L — SIGNIFICANT CHANGE UP (ref 84–241)
LYMPHOCYTES # BLD AUTO: 1.4 K/UL — SIGNIFICANT CHANGE UP (ref 1–3.3)
LYMPHOCYTES # BLD AUTO: 1.4 K/UL — SIGNIFICANT CHANGE UP (ref 1–3.3)
LYMPHOCYTES # BLD AUTO: 7.2 % — LOW (ref 13–44)
LYMPHOCYTES # BLD AUTO: 7.2 % — LOW (ref 13–44)
MCHC RBC-ENTMCNC: 25.1 PG — LOW (ref 27–34)
MCHC RBC-ENTMCNC: 25.1 PG — LOW (ref 27–34)
MCHC RBC-ENTMCNC: 32.2 GM/DL — SIGNIFICANT CHANGE UP (ref 32–36)
MCHC RBC-ENTMCNC: 32.2 GM/DL — SIGNIFICANT CHANGE UP (ref 32–36)
MCV RBC AUTO: 78 FL — LOW (ref 80–100)
MCV RBC AUTO: 78 FL — LOW (ref 80–100)
MONOCYTES # BLD AUTO: 0.96 K/UL — HIGH (ref 0–0.9)
MONOCYTES # BLD AUTO: 0.96 K/UL — HIGH (ref 0–0.9)
MONOCYTES NFR BLD AUTO: 5 % — SIGNIFICANT CHANGE UP (ref 2–14)
MONOCYTES NFR BLD AUTO: 5 % — SIGNIFICANT CHANGE UP (ref 2–14)
NEUTROPHILS # BLD AUTO: 16.9 K/UL — HIGH (ref 1.8–7.4)
NEUTROPHILS # BLD AUTO: 16.9 K/UL — HIGH (ref 1.8–7.4)
NEUTROPHILS NFR BLD AUTO: 87 % — HIGH (ref 43–77)
NEUTROPHILS NFR BLD AUTO: 87 % — HIGH (ref 43–77)
PLATELET # BLD AUTO: 223 K/UL — SIGNIFICANT CHANGE UP (ref 150–400)
PLATELET # BLD AUTO: 223 K/UL — SIGNIFICANT CHANGE UP (ref 150–400)
POTASSIUM SERPL-MCNC: 4 MMOL/L — SIGNIFICANT CHANGE UP (ref 3.5–5.3)
POTASSIUM SERPL-MCNC: 4 MMOL/L — SIGNIFICANT CHANGE UP (ref 3.5–5.3)
POTASSIUM SERPL-SCNC: 4 MMOL/L — SIGNIFICANT CHANGE UP (ref 3.5–5.3)
POTASSIUM SERPL-SCNC: 4 MMOL/L — SIGNIFICANT CHANGE UP (ref 3.5–5.3)
PROT SERPL-MCNC: 6.5 GM/DL — SIGNIFICANT CHANGE UP (ref 6–8.3)
PROT SERPL-MCNC: 6.5 GM/DL — SIGNIFICANT CHANGE UP (ref 6–8.3)
PROTHROM AB SERPL-ACNC: 10.4 SEC — SIGNIFICANT CHANGE UP (ref 9.5–13)
PROTHROM AB SERPL-ACNC: 10.4 SEC — SIGNIFICANT CHANGE UP (ref 9.5–13)
RBC # BLD: 4.18 M/UL — SIGNIFICANT CHANGE UP (ref 3.8–5.2)
RBC # BLD: 4.18 M/UL — SIGNIFICANT CHANGE UP (ref 3.8–5.2)
RBC # FLD: 15.3 % — HIGH (ref 10.3–14.5)
RBC # FLD: 15.3 % — HIGH (ref 10.3–14.5)
SODIUM SERPL-SCNC: 138 MMOL/L — SIGNIFICANT CHANGE UP (ref 135–145)
SODIUM SERPL-SCNC: 138 MMOL/L — SIGNIFICANT CHANGE UP (ref 135–145)
T PALLIDUM AB TITR SER: NEGATIVE — SIGNIFICANT CHANGE UP
T PALLIDUM AB TITR SER: NEGATIVE — SIGNIFICANT CHANGE UP
URATE SERPL-MCNC: 4.4 MG/DL — SIGNIFICANT CHANGE UP (ref 2.5–7)
URATE SERPL-MCNC: 4.4 MG/DL — SIGNIFICANT CHANGE UP (ref 2.5–7)
WBC # BLD: 19.39 K/UL — HIGH (ref 3.8–10.5)
WBC # BLD: 19.39 K/UL — HIGH (ref 3.8–10.5)
WBC # FLD AUTO: 19.39 K/UL — HIGH (ref 3.8–10.5)
WBC # FLD AUTO: 19.39 K/UL — HIGH (ref 3.8–10.5)

## 2023-10-21 RX ORDER — OXYTOCIN 10 UNIT/ML
41.67 VIAL (ML) INJECTION
Qty: 20 | Refills: 0 | Status: DISCONTINUED | OUTPATIENT
Start: 2023-10-21 | End: 2023-10-23

## 2023-10-21 RX ORDER — SIMETHICONE 80 MG/1
80 TABLET, CHEWABLE ORAL EVERY 4 HOURS
Refills: 0 | Status: DISCONTINUED | OUTPATIENT
Start: 2023-10-21 | End: 2023-10-23

## 2023-10-21 RX ORDER — KETOROLAC TROMETHAMINE 30 MG/ML
30 SYRINGE (ML) INJECTION ONCE
Refills: 0 | Status: DISCONTINUED | OUTPATIENT
Start: 2023-10-21 | End: 2023-10-21

## 2023-10-21 RX ORDER — HYDROCORTISONE 1 %
1 OINTMENT (GRAM) TOPICAL EVERY 6 HOURS
Refills: 0 | Status: DISCONTINUED | OUTPATIENT
Start: 2023-10-21 | End: 2023-10-23

## 2023-10-21 RX ORDER — TETANUS TOXOID, REDUCED DIPHTHERIA TOXOID AND ACELLULAR PERTUSSIS VACCINE, ADSORBED 5; 2.5; 8; 8; 2.5 [IU]/.5ML; [IU]/.5ML; UG/.5ML; UG/.5ML; UG/.5ML
0.5 SUSPENSION INTRAMUSCULAR ONCE
Refills: 0 | Status: DISCONTINUED | OUTPATIENT
Start: 2023-10-21 | End: 2023-10-23

## 2023-10-21 RX ORDER — DIPHENHYDRAMINE HCL 50 MG
25 CAPSULE ORAL EVERY 6 HOURS
Refills: 0 | Status: DISCONTINUED | OUTPATIENT
Start: 2023-10-21 | End: 2023-10-23

## 2023-10-21 RX ORDER — PRAMOXINE HYDROCHLORIDE 150 MG/15G
1 AEROSOL, FOAM RECTAL EVERY 4 HOURS
Refills: 0 | Status: DISCONTINUED | OUTPATIENT
Start: 2023-10-21 | End: 2023-10-23

## 2023-10-21 RX ORDER — DIBUCAINE 1 %
1 OINTMENT (GRAM) RECTAL EVERY 6 HOURS
Refills: 0 | Status: DISCONTINUED | OUTPATIENT
Start: 2023-10-21 | End: 2023-10-23

## 2023-10-21 RX ORDER — OXYCODONE HYDROCHLORIDE 5 MG/1
5 TABLET ORAL
Refills: 0 | Status: DISCONTINUED | OUTPATIENT
Start: 2023-10-21 | End: 2023-10-23

## 2023-10-21 RX ORDER — IBUPROFEN 200 MG
600 TABLET ORAL EVERY 6 HOURS
Refills: 0 | Status: COMPLETED | OUTPATIENT
Start: 2023-10-21 | End: 2024-09-18

## 2023-10-21 RX ORDER — LANOLIN
1 OINTMENT (GRAM) TOPICAL EVERY 6 HOURS
Refills: 0 | Status: DISCONTINUED | OUTPATIENT
Start: 2023-10-21 | End: 2023-10-23

## 2023-10-21 RX ORDER — ACETAMINOPHEN 500 MG
975 TABLET ORAL
Refills: 0 | Status: DISCONTINUED | OUTPATIENT
Start: 2023-10-21 | End: 2023-10-23

## 2023-10-21 RX ORDER — BENZOCAINE 10 %
1 GEL (GRAM) MUCOUS MEMBRANE EVERY 6 HOURS
Refills: 0 | Status: DISCONTINUED | OUTPATIENT
Start: 2023-10-21 | End: 2023-10-23

## 2023-10-21 RX ORDER — MAGNESIUM HYDROXIDE 400 MG/1
30 TABLET, CHEWABLE ORAL
Refills: 0 | Status: DISCONTINUED | OUTPATIENT
Start: 2023-10-21 | End: 2023-10-23

## 2023-10-21 RX ORDER — AER TRAVELER 0.5 G/1
1 SOLUTION RECTAL; TOPICAL EVERY 4 HOURS
Refills: 0 | Status: DISCONTINUED | OUTPATIENT
Start: 2023-10-21 | End: 2023-10-23

## 2023-10-21 RX ORDER — IBUPROFEN 200 MG
600 TABLET ORAL EVERY 6 HOURS
Refills: 0 | Status: DISCONTINUED | OUTPATIENT
Start: 2023-10-21 | End: 2023-10-23

## 2023-10-21 RX ORDER — OXYCODONE HYDROCHLORIDE 5 MG/1
5 TABLET ORAL ONCE
Refills: 0 | Status: DISCONTINUED | OUTPATIENT
Start: 2023-10-21 | End: 2023-10-23

## 2023-10-21 RX ORDER — SODIUM CHLORIDE 9 MG/ML
3 INJECTION INTRAMUSCULAR; INTRAVENOUS; SUBCUTANEOUS EVERY 8 HOURS
Refills: 0 | Status: DISCONTINUED | OUTPATIENT
Start: 2023-10-21 | End: 2023-10-22

## 2023-10-21 RX ADMIN — Medication 975 MILLIGRAM(S): at 15:38

## 2023-10-21 RX ADMIN — SODIUM CHLORIDE 3 MILLILITER(S): 9 INJECTION INTRAMUSCULAR; INTRAVENOUS; SUBCUTANEOUS at 06:42

## 2023-10-21 RX ADMIN — Medication 975 MILLIGRAM(S): at 21:15

## 2023-10-21 RX ADMIN — Medication 600 MILLIGRAM(S): at 18:43

## 2023-10-21 RX ADMIN — Medication 975 MILLIGRAM(S): at 22:10

## 2023-10-21 RX ADMIN — Medication 975 MILLIGRAM(S): at 08:40

## 2023-10-21 RX ADMIN — Medication 600 MILLIGRAM(S): at 13:30

## 2023-10-21 RX ADMIN — Medication 1000 MILLIUNIT(S)/MIN: at 01:42

## 2023-10-21 RX ADMIN — Medication 30 MILLIGRAM(S): at 02:14

## 2023-10-21 RX ADMIN — SODIUM CHLORIDE 3 MILLILITER(S): 9 INJECTION INTRAMUSCULAR; INTRAVENOUS; SUBCUTANEOUS at 21:20

## 2023-10-21 RX ADMIN — SODIUM CHLORIDE 125 MILLILITER(S): 9 INJECTION, SOLUTION INTRAVENOUS at 01:42

## 2023-10-21 RX ADMIN — Medication 975 MILLIGRAM(S): at 16:10

## 2023-10-21 RX ADMIN — Medication 600 MILLIGRAM(S): at 18:13

## 2023-10-21 RX ADMIN — Medication 30 MILLIGRAM(S): at 02:29

## 2023-10-21 RX ADMIN — Medication 600 MILLIGRAM(S): at 12:56

## 2023-10-21 RX ADMIN — Medication 975 MILLIGRAM(S): at 08:07

## 2023-10-21 NOTE — PROGRESS NOTE ADULT - SUBJECTIVE AND OBJECTIVE BOX
CONCHITA PIZARRO is a 32y   s/p VAVD @ 39w5d    SUBJECTIVE:  No acute events overnight.  Patient has no complaints.  Pain is well controlled, minor cramping.  -flatus, + voiding, -BM  Ambulating and tolerating PO.  Appropriate lochia - decreasing.  Denies headaches, palpitation, chest pain, dyspnea, nausea and vomiting.    OBJECTIVE:  Physical exam:  General: AOx3, NAD.  Abdomen: Soft, appropriately tender to palpitation, fundus firm.  Vaginal: expectant lochia   Ext: No calf tenderness ,no LE edema    Vital Signs Last 24 Hrs  T(C): 36.6 (21 Oct 2023 03:13), Max: 36.8 (21 Oct 2023 00:50)  T(F): 97.9 (21 Oct 2023 03:13), Max: 98.2 (21 Oct 2023 00:50)  HR: 74 (21 Oct 2023 03:13) (74 - 106)  BP: 127/78 (21 Oct 2023 03:13) (122/68 - 161/82)  BP(mean): 91 (21 Oct 2023 03:13) (91 - 91)  RR: 18 (21 Oct 2023 03:13) (16 - 18)  SpO2: 97% (21 Oct 2023 03:13) (97% - 97%)    Parameters below as of 21 Oct 2023 03:13  Patient On (Oxygen Delivery Method): room air        LABS:                        10.5   19.39 )-----------( 223      ( 21 Oct 2023 02:14 )             32.6     10-    138  |  108  |  4<L>  ----------------------------<  107<H>  4.0   |  21<L>  |  0.51    Ca    8.9      21 Oct 2023 02:14    TPro  6.5  /  Alb  2.2<L>  /  TBili  0.3  /  DBili  x   /  AST  14<L>  /  ALT  9<L>  /  AlkPhos  140<H>  10-21

## 2023-10-22 ENCOUNTER — TRANSCRIPTION ENCOUNTER (OUTPATIENT)
Age: 33
End: 2023-10-22

## 2023-10-22 LAB
HCT VFR BLD CALC: 30.4 % — LOW (ref 34.5–45)
HCT VFR BLD CALC: 30.4 % — LOW (ref 34.5–45)
HGB BLD-MCNC: 9.7 G/DL — LOW (ref 11.5–15.5)
HGB BLD-MCNC: 9.7 G/DL — LOW (ref 11.5–15.5)

## 2023-10-22 RX ORDER — ACETAMINOPHEN 500 MG
3 TABLET ORAL
Qty: 0 | Refills: 0 | DISCHARGE
Start: 2023-10-22

## 2023-10-22 RX ORDER — IBUPROFEN 200 MG
1 TABLET ORAL
Qty: 0 | Refills: 0 | DISCHARGE
Start: 2023-10-22

## 2023-10-22 RX ORDER — METFORMIN HYDROCHLORIDE 850 MG/1
1 TABLET ORAL
Qty: 0 | Refills: 0 | DISCHARGE

## 2023-10-22 RX ADMIN — Medication 975 MILLIGRAM(S): at 08:14

## 2023-10-22 RX ADMIN — Medication 975 MILLIGRAM(S): at 15:06

## 2023-10-22 RX ADMIN — Medication 600 MILLIGRAM(S): at 06:21

## 2023-10-22 RX ADMIN — SODIUM CHLORIDE 3 MILLILITER(S): 9 INJECTION INTRAMUSCULAR; INTRAVENOUS; SUBCUTANEOUS at 06:15

## 2023-10-22 RX ADMIN — Medication 600 MILLIGRAM(S): at 01:05

## 2023-10-22 RX ADMIN — Medication 600 MILLIGRAM(S): at 12:35

## 2023-10-22 RX ADMIN — Medication 975 MILLIGRAM(S): at 22:14

## 2023-10-22 RX ADMIN — Medication 600 MILLIGRAM(S): at 00:11

## 2023-10-22 RX ADMIN — Medication 975 MILLIGRAM(S): at 21:15

## 2023-10-22 RX ADMIN — Medication 600 MILLIGRAM(S): at 07:03

## 2023-10-22 RX ADMIN — Medication 600 MILLIGRAM(S): at 13:04

## 2023-10-22 RX ADMIN — Medication 975 MILLIGRAM(S): at 08:40

## 2023-10-22 NOTE — DISCHARGE NOTE OB - CARE PLAN
Principal Discharge DX:	Vaginal delivery  Assessment and plan of treatment:	1. Take Motrin 600mg every 6 hours and/or tylenol 650mg every 6 hours as needed for pain.   2. Call your OBGYN to schedule a follow up appointment in 4-6weeks.   3. Call your OBGYN if you experiences severe abdominal pain not improved by oral pain medications, heavy bright red vaginal bleeding saturating more than 1 pad per hour, or fever greater than 100.4F.  4. Place nothing into the vagina for 6 weeks ( no tampons, sex or douching )  5. Expect to feel hot flashes in the first 2 weeks after delivery, especially if your breasts are engorged.   1

## 2023-10-22 NOTE — DISCHARGE NOTE OB - NS MD DC FALL RISK RISK
For information on Fall & Injury Prevention, visit: [email or web address] OR  [email or web address] OR  https://www.cdc.gov/steadi/patient.html

## 2023-10-22 NOTE — PROGRESS NOTE ADULT - ATTENDING COMMENTS
patient voiding and ambulating with no issues, mild leukocytosis likely from laboring, will repeat CBC prior to discharge, no signs of infection at this time. Mild anemia noted placed on iron. BP wnl, PIH labs negative, no PEC symptoms.

## 2023-10-22 NOTE — DISCHARGE NOTE OB - MEDICATION SUMMARY - MEDICATIONS TO TAKE
I will START or STAY ON the medications listed below when I get home from the hospital:    ibuprofen 600 mg oral tablet  -- 1 tab(s) by mouth every 6 hours  -- Indication: For pain    acetaminophen 325 mg oral tablet  -- 3 tab(s) by mouth every 6 hours  -- Indication: For pain    Prenatal Multivitamins with Folic Acid 1 mg oral tablet  -- 1 tab(s) by mouth once a day  -- Indication: For home med

## 2023-10-22 NOTE — PROGRESS NOTE ADULT - SUBJECTIVE AND OBJECTIVE BOX
CONCHITA PIZARRO is a 33yo  now PPD# 1 s/p VAVD @ 39w5d  S:    The patient has no complaints.  Pain controlled with current medications.   She is ambulating without difficulty and tolerating PO   + flatus/-BM/+ voiding   She endorses approciate lochia, which is decreasing  She is breastfeeding  She denies fevers, chills, nausea and vomiting.   She denies lightheadedness, dizziness, palpitations, chest pain and SOB.    O:    T(C): 36.7 (10-22-23 @ 00:13), Max: 36.7 (10-21-23 @ 20:00)  HR: 86 (10-22-23 @ 00:13) (86 - 88)  BP: 100/62 (10-22-23 @ 00:13) (100/62 - 130/80)  RR: 16 (10-22-23 @ 00:13) (16 - 18)  SpO2: 98% (10-22-23 @ 00:13) (98% - 100%)    Gen: NAD, AOx3  CV: RRR  Pulm: CTAB  Abdomen:  soft, non-tender, non-distended, +bowel sounds.  Uterus:  Fundus firm below umbilicus  VE:  +lochia  Ext:  Non-tender.                          9.7    x     )-----------( x        ( 22 Oct 2023 08:09 )             30.4     10-21    138  |  108  |  4<L>  ----------------------------<  107<H>  4.0   |  21<L>  |  0.51    Ca    8.9      21 Oct 2023 02:14    TPro  6.5  /  Alb  2.2<L>  /  TBili  0.3  /  DBili  x   /  AST  14<L>  /  ALT  9<L>  /  AlkPhos  140<H>  10-21      A/P:  Patient is a 33yo  now PPD# 1 s/p VAVD @ 39w5d  -Vital signs stable  -Postpartum H&H stable  -Voiding, tolerating PO, bowel function nml   -Advance care as tolerated   -Continue routine postpartum care and education.  -Healthy female infant    CONCHITA PIZARRO is a 33yo  now PPD# 1 s/p VAVD @ 39w5d  S:    The patient has no complaints.  Pain controlled with current medications.   She is ambulating without difficulty and tolerating PO   + flatus/-BM/+ voiding   She endorses approciate lochia, which is decreasing  She is breastfeeding  She denies fevers, chills, nausea and vomiting.   She denies lightheadedness, dizziness, palpitations, chest pain and SOB.    O:    T(C): 36.7 (10-22-23 @ 00:13), Max: 36.7 (10-21-23 @ 20:00)  HR: 86 (10-22-23 @ 00:13) (86 - 88)  BP: 100/62 (10-22-23 @ 00:13) (100/62 - 130/80)  RR: 16 (10-22-23 @ 00:13) (16 - 18)  SpO2: 98% (10-22-23 @ 00:13) (98% - 100%)    Gen: NAD, AOx3  CV: RRR  Pulm: CTAB  Abdomen:  soft, non-tender, non-distended, +bowel sounds.  Uterus:  Fundus firm below umbilicus  VE:  +lochia  Ext:  Non-tender.                          9.7    x     )-----------( x        ( 22 Oct 2023 08:09 )             30.4     10-21    138  |  108  |  4<L>  ----------------------------<  107<H>  4.0   |  21<L>  |  0.51    Ca    8.9      21 Oct 2023 02:14    TPro  6.5  /  Alb  2.2<L>  /  TBili  0.3  /  DBili  x   /  AST  14<L>  /  ALT  9<L>  /  AlkPhos  140<H>  10-21

## 2023-10-22 NOTE — DISCHARGE NOTE OB - HOSPITAL COURSE
31yo  s/p VAVD @ 39w5d    Upon discharge she is voiding, tolerating PO, ambulating, lochia is decreasing, labs and vitals were stable, and pain is well controlled.

## 2023-10-22 NOTE — DISCHARGE NOTE OB - CARE PROVIDER_API CALL
Ana Cristina Huffman  Obstetrics and Gynecology  [address], [address]  [place] [address]  Phone: [phone]  Fax: [phone]  Established Patient  Follow Up Time: 1 month

## 2023-10-22 NOTE — DISCHARGE NOTE OB - DO NOT DIET OR “STARVE” YOURSELF INTO GETTING BACK TO PRE-PREGNANCY SHAPE
Statement Selected Complex Repair And M Plasty Text: The defect edges were debeveled with a #15 scalpel blade.  The primary defect was closed partially with a complex linear closure.  Given the location of the remaining defect, shape of the defect and the proximity to free margins an M plasty was deemed most appropriate for complete closure of the defect.  Using a sterile surgical marker, an appropriate advancement flap was drawn incorporating the defect and placing the expected incisions within the relaxed skin tension lines where possible.    The area thus outlined was incised deep to adipose tissue with a #15 scalpel blade.  The skin margins were undermined to an appropriate distance in all directions utilizing iris scissors.

## 2023-10-22 NOTE — DISCHARGE NOTE OB - PATIENT PORTAL LINK FT
You can access the FollowMyHealth Patient Portal offered by [facility] by registering at the following website: [email or web address]. By joining [facility]’s FollowMyHealth portal, you will also be able to view your health information using other applications (apps) compatible with our system.

## 2023-10-23 VITALS
OXYGEN SATURATION: 100 % | TEMPERATURE: 98 F | RESPIRATION RATE: 17 BRPM | SYSTOLIC BLOOD PRESSURE: 109 MMHG | HEART RATE: 79 BPM | DIASTOLIC BLOOD PRESSURE: 82 MMHG

## 2023-10-23 LAB
HCT VFR BLD CALC: 34.1 % — LOW (ref 34.5–45)
HCT VFR BLD CALC: 34.1 % — LOW (ref 34.5–45)
HGB BLD-MCNC: 10.7 G/DL — LOW (ref 11.5–15.5)
HGB BLD-MCNC: 10.7 G/DL — LOW (ref 11.5–15.5)
MCHC RBC-ENTMCNC: 25.2 PG — LOW (ref 27–34)
MCHC RBC-ENTMCNC: 25.2 PG — LOW (ref 27–34)
MCHC RBC-ENTMCNC: 31.4 GM/DL — LOW (ref 32–36)
MCHC RBC-ENTMCNC: 31.4 GM/DL — LOW (ref 32–36)
MCV RBC AUTO: 80.4 FL — SIGNIFICANT CHANGE UP (ref 80–100)
MCV RBC AUTO: 80.4 FL — SIGNIFICANT CHANGE UP (ref 80–100)
PLATELET # BLD AUTO: 230 K/UL — SIGNIFICANT CHANGE UP (ref 150–400)
PLATELET # BLD AUTO: 230 K/UL — SIGNIFICANT CHANGE UP (ref 150–400)
RBC # BLD: 4.24 M/UL — SIGNIFICANT CHANGE UP (ref 3.8–5.2)
RBC # BLD: 4.24 M/UL — SIGNIFICANT CHANGE UP (ref 3.8–5.2)
RBC # FLD: 15.5 % — HIGH (ref 10.3–14.5)
RBC # FLD: 15.5 % — HIGH (ref 10.3–14.5)
WBC # BLD: 10.56 K/UL — HIGH (ref 3.8–10.5)
WBC # BLD: 10.56 K/UL — HIGH (ref 3.8–10.5)
WBC # FLD AUTO: 10.56 K/UL — HIGH (ref 3.8–10.5)
WBC # FLD AUTO: 10.56 K/UL — HIGH (ref 3.8–10.5)

## 2023-10-23 RX ADMIN — Medication 600 MILLIGRAM(S): at 07:15

## 2023-10-23 RX ADMIN — Medication 600 MILLIGRAM(S): at 00:28

## 2023-10-23 RX ADMIN — Medication 600 MILLIGRAM(S): at 06:28

## 2023-10-23 RX ADMIN — Medication 975 MILLIGRAM(S): at 08:19

## 2023-10-23 RX ADMIN — Medication 600 MILLIGRAM(S): at 01:25

## 2023-10-23 NOTE — PROGRESS NOTE ADULT - ATTENDING COMMENTS
patient voiding and ambulating with no issues, stable for discharge home today patient voiding and ambulating with no issues, stable for discharge home today with follow up with Dr Huffman in 4-6 weeks

## 2023-10-23 NOTE — PROGRESS NOTE ADULT - ASSESSMENT
CONCHITA PIZARRO is a 32y   s/p JUSTYNA @ 39w5d    #Routine post partum care  - Stable, doing well postpartum  - Hgb 10.5  - Pain: well controlled on PO pain meds  - GI: regular diet, no BM/flatulence yet  - : voiding , lochia decreasing   - DVT ppx: ambulation encouraged  
  A/P:  Patient is a 33yo  now PPD# 2 s/p VAVD @ 39w5d  -Vital signs stable  -Hgb: 9.7 --> 10.7 s/p iron supplementation   -Voiding, tolerating PO, bowel function nml   -Advance care as tolerated   -Continue routine postpartum care and education  -Healthy female infant  -Dispo: Pt is stable, doing well and meeting all postpartum and postoperative milestones. Possible discharge to home today pending attending approval.    
A/P:  Patient is a 31 yo  now PPD# 1 s/p VAVD @ 39w5d  -Vital signs stable  -Postpartum H&H stable, mild anemia noted placed on iron, will repeat CBC tomorrow   -Voiding, tolerating PO, bowel function nml   -Advance care as tolerated   -Continue routine postpartum care and education.  -Healthy female infant   -Disposition: likely discharge home tomorrow

## 2023-10-23 NOTE — PROGRESS NOTE ADULT - SUBJECTIVE AND OBJECTIVE BOX
CONCHITA PIZARRO is a 33yo  now PPD# 2 s/p VAVD @ 39w5d    S:    No acute events overnight.   The patient has no complaints.  Pain controlled with current treatment regimen.   She is ambulating without difficulty and tolerating PO.   + flatus/-BM/+ voiding   She endorses appropriate lochia, which is decreasing. Changes her pad every 3hrs but there is minimal blood.  She is breastfeeding without difficulty. Plans to bottle and breast feed. No problems with baby latching.  She denies fevers, chills, nausea and vomiting.   She denies lightheadedness, dizziness, palpitations, chest pain and SOB.     O:    T(C): 36.8 (10-22- @ 20:00), Max: 36.8 (10-22- @ 20:00)  HR: 71 (10-22- @ 20:00) (71 - 71)  BP: 107/64 (10-22-23 @ 20:00) (107/64 - 107/64)  RR: 18 (10-22-23 @ 20:00) (18 - 18)  SpO2: 99% (10-22-23 @ 20:00) (99% - 99%)    Gen: NAD, AOx3  CV: RRR, S1/S2 present  Pulm: CTAB  Abdomen:  Soft, non-tender, non-distended, +bowel sounds  Uterus:  Fundus firm below umbilicus  VE:  Expectant lochia  Ext: +Mild b/l foot edema. No leg edema. Non-tender extremities.                          10.7   10.56 )-----------( 230      ( 23 Oct 2023 08:16 )             34.1             A/P:  Patient is a 33yo  now PPD# 2 s/p VAVD @ 39w5d  -Vital signs stable  -Hgb: 9.7 --> 10.7 s/p iron supplementation   -Voiding, tolerating PO, bowel function nml   -Advance care as tolerated   -Continue routine postpartum care and education  -Healthy female infant  -Dispo: Pt is stable, doing well and meeting all postpartum and postoperative milestones. Possible discharge to home today pending attending approval.   CONCHITA PIZARRO is a 31yo  now PPD# 2 s/p VAVD @ 39w5d    S:    No acute events overnight.   The patient has no complaints.  Pain controlled with current treatment regimen.   She is ambulating without difficulty and tolerating PO.   + flatus/-BM/+ voiding   She endorses appropriate lochia, which is decreasing. Changes her pad every 3hrs but there is minimal blood.  She is breastfeeding without difficulty. Plans to bottle and breast feed. No problems with baby latching.  She denies fevers, chills, nausea and vomiting.   She denies lightheadedness, dizziness, palpitations, chest pain and SOB.     O:    T(C): 36.8 (10-22-23 @ 20:00), Max: 36.8 (10-22-23 @ 20:00)  HR: 71 (10-22-23 @ 20:00) (71 - 71)  BP: 107/64 (10-22- @ 20:00) (107/64 - 107/64)  RR: 18 (10-22-23 @ 20:00) (18 - 18)  SpO2: 99% (10-22-23 @ 20:00) (99% - 99%)    Gen: NAD, AOx3  CV: RRR, S1/S2 present  Pulm: CTAB  Abdomen:  Soft, non-tender, non-distended, +bowel sounds  Uterus:  Fundus firm below umbilicus  VE:  Expectant lochia  Ext: +Mild b/l foot edema. No leg edema. Non-tender extremities.                          10.7   10.56 )-----------( 230      ( 23 Oct 2023 08:16 )             34.1

## 2023-10-26 DIAGNOSIS — Z3A.40 40 WEEKS GESTATION OF PREGNANCY: ICD-10-CM

## 2023-10-26 DIAGNOSIS — Z34.83 ENCOUNTER FOR SUPERVISION OF OTHER NORMAL PREGNANCY, THIRD TRIMESTER: ICD-10-CM

## 2023-10-26 DIAGNOSIS — Z28.09 IMMUNIZATION NOT CARRIED OUT BECAUSE OF OTHER CONTRAINDICATION: ICD-10-CM

## 2023-10-26 DIAGNOSIS — Z28.21 IMMUNIZATION NOT CARRIED OUT BECAUSE OF PATIENT REFUSAL: ICD-10-CM

## 2023-10-26 DIAGNOSIS — O48.0 POST-TERM PREGNANCY: ICD-10-CM

## 2024-02-05 NOTE — ED ADULT NURSE NOTE - SUICIDE SCREENING QUESTION 2
SLP Bedside Swallow Evaluation  02/05/24 0516   Appointment Info   Signing Clinician's Name / Credentials (SLP) Madelin Katz MA [facility] SLP   General Information   Onset of Illness/Injury or Date of Surgery 02/05/24   Referring Physician Dr. Betancur   Patient/Family Therapy Goal Statement (SLP) To eat breakfast   Pertinent History of Current Problem Per notes: 88M hx of TIA HLD, RLE DVT on xeralto, cognitive impairment, arthritis, CKD, HTN, BPH constipation, GERD presenting with a fall 2/2 RLE weakness.  RLE weakness, L sided decreased Sensation. R/O Stroke  Recurrent Falls  Coagulopathy 2/2 Medication Use  Hx TIA  Hx HLD  ``Hx: Patient somewhat poor historian but states he fell today as 'legs gave out'. Wife states that for the entire day prior to presentation, he was dragging his R leg, and he fell on his way out of the bathroom due to R leg weakness. Patient denies HA/blurry vision/CP/SOB. Denies numbness. Denies any pain, and states he was asymptomatic. Endorses chronic urinary incotinencen Wife reports TIA years ago and was then started on xeralto. No headtruama/LOC, though state that weeks ago when he fell there was Headtraume wihtout LOC.  ``Vitals/Exam: SBP 140s. No focal strength, decrease sensation LUE and LLE, L facial droop. Doesn't follow all commands with possible aphasia., RLE edema pittingL cheeck bruise, LLE abrasians with cellulitis covered with wrapping. Not oriented towards the presidency. At baseline mentation.   General Observations Pt was alert and cooperative; however, decreased ability to provide bio info and dysphagia hx noted.  Spouse reports pt coughs with meals at times at baseline.   Type of Evaluation   Type of Evaluation Swallow Evaluation   Oral Motor   Oral Musculature generally intact  (L lip slanted downward at rest, overall good ROM, oral apraxia appeared to be present)   Dentition (Oral Motor)   Dentition (Oral Motor) dental appliance/dentures   Cough/Swallow/Gag Reflex  "(Oral Motor)   Comment, Cough/Swallow/Gag Reflex (Oral Motor) Intact cough and swallow on command, although swallow delayed and effort noted   Vocal Quality/Secretion Management (Oral Motor)   Comment, Vocal Quality/Secretion Management (Oral Motor) WFL   General Swallowing Observations   Past History of Dysphagia Hx includes VFSS on 12/26/16 with tight UES/slight reflux, delayed swallows, and flash penetration of thin reported; easy to chew to regular diet recommended at that time; \"Choking\" episodes reported in 2020, bedside swallow eval on 3/3/20 completed with easy to chew - regular diet recommended.  Spouse states pt coughs with po approx 1x/week at baseline.  Pneumonia reported a month ago.   Swallowing Evaluation Clinical swallow evaluation   Clinical Swallow Evaluation   Clinical Swallow Evaluation Textures Trialed thin liquids;pureed;soft & bite-sized   Clinical Swallow Eval: Thin Liquid Texture Trial   Mode of Presentation, Thin Liquids cup;straw   Volume of Liquid or Food Presented sips by cup x 3, sips by straw x 3   Oral Phase of Swallow effortful AP movement  (holding; cues needed to swallow at times)   Pharyngeal Phase of Swallow repeated swallows;reduction in laryngeal movement  (delay)   Diagnostic Statement piecemeal swallows during initial swallows - ? oral apraxia; as trials continued, more natural swallow pattern noted; no signs of aspiration   Clinical Swallow Evaluation: Puree Solid Texture Trial   Mode of Presentation, Puree spoon   Volume of Puree Presented tsps x 2   Oral Phase, Puree delayed AP movement;residue in oral cavity  (holding; cues needed to swallow at times)   Pharyngeal Phase, Puree reduction in laryngeal movement;repeated swallows  (delay suspected)   Diagnostic Statement discoordinated swallow initiation with first trial, piecemeal- ? oral apraxia; no signs of aspiration   Clinical Swallow Eval: Soft & Bite Sized   Mode of Presentation spoon   Volume Presented tsps x 2   Oral " Phase delayed AP movement;residue in oral cavity  (holding; cues needed to swallow at times; increased mastication time due to decreased dentition/coordination)   Pharyngeal Phase reduction in laryngeal movement;repeated swallows  (delay suspected)   Diagnostic Statement no signs of aspiration, alternating used to clear oral cavity   Swallowing Recommendations   Diet Consistency Recommendations soft & bite-sized (level 6);thin liquids (level 0)   Supervision Level for Intake close supervision needed   Mode of Delivery Recommendations bolus size, small;slow rate of intake  (chew carefully, cue pt to swallow as needed)   Swallowing Maneuver Recommendations alternate food and liquid intake;extra swallow;effortful (hard) swallow   Monitoring/Assistance Required (Eating/Swallowing) monitor for cough or change in vocal quality with intake;check mouth frequently for oral residue/pocketing   Recommended Feeding/Eating Techniques (Swallow Eval) maintain upright posture during/after eating for 30 minutes;minimize distractions during oral intake   Medication Administration Recommendations, Swallowing (SLP) Meds with small sips of thin liquids, use puree as needed   Instrumental Assessment Recommendations   (to be determined)   Comment, Swallowing Recommendations Hold po if increased aspiration signs noted/respiratory status declines   Clinical Impression   Criteria for Skilled Therapeutic Interventions Met (SLP Eval) Yes, treatment indicated   SLP Diagnosis Mild-moderate oral-pharyngeal dysphagia   Risks & Benefits of therapy have been explained evaluation/treatment results reviewed;care plan/treatment goals reviewed;risks/benefits reviewed;current/potential barriers reviewed;participants voiced agreement with care plan;participants included;patient;spouse/significant other   Clinical Impression Comments Mild-moderate oral-pharyngeal dysphagia was found during today's SLP bedside swallow evaluation.  Deficits/risk factors  include hx of dysphagia, oral motor deficits, decreased oral function/awareness with cues needed to swallow at  times, oral residue, partial dentures not present, decreased elevation, and need for multiple swallows.  No overt aspiration signs noted; however, swallow function may be below baseline.  Recommend a Combination Diet Soft and Bite Sized Diet (level 6); Thin Liquids (level 0) with supervision, sit at 90 degrees, small bites/sips, alternate textures, extra swallows, chew carefully.  Hold po if increased aspiration signs noted/respiratory status declines.  Plan to continue swallow Tx to  assess diet tolerance, train strategies, and trial upgrades as indicated.   SLP Total Evaluation Time   Eval: oral/pharyngeal swallow function, clinical swallow Minutes (92127) 15   Interventions   Interventions Quick Adds Swallowing Dysfunction   Swallowing Dysfunction &/or Oral Function for Feeding   Treatment of Swallowing Dysfunction &/or Oral Function for Feeding Minutes (96362) 10   Symptoms Noted During/After Treatment None   Treatment Detail/Skilled Intervention Education provided to pt, family, and RN regarding recommended diet with precautions.  All verbalized understanding; however, pt will need cues due to decreased awareness and recall.  Pt took a small sip of thin liquids by straw in Tx with min cues.  No overt aspiration signs noted.   SLP Discharge Planning   SLP Plan Assess diet tolerance, train strategies, trial upgrades   SLP Discharge Recommendation Transitional Care Facility   SLP Rationale for DC Rec Swallow function appears to be below baseline; cognitive-linguistic deficits noted - ? pt's baseline; supervision after discharge   SLP Brief overview of current status  Mild-mod dysphagia; Rec: Combination Diet Soft and Bite Sized Diet (level 6); Thin Liquids (level 0) with supervision, sit at 90 degrees, small bites/sips, alternate textures, extra swallows, chew carefully. Hold po if increased aspiration  signs noted/respiratory status declines.   Total Session Time   Total Session Time (sum of timed and untimed services) 25        No

## 2025-02-24 ENCOUNTER — APPOINTMENT (OUTPATIENT)
Dept: PHYSICAL MEDICINE AND REHAB | Facility: CLINIC | Age: 35
End: 2025-02-24
Payer: COMMERCIAL

## 2025-02-24 VITALS
HEIGHT: 64 IN | OXYGEN SATURATION: 98 % | SYSTOLIC BLOOD PRESSURE: 122 MMHG | WEIGHT: 150 LBS | DIASTOLIC BLOOD PRESSURE: 86 MMHG | HEART RATE: 94 BPM | BODY MASS INDEX: 25.61 KG/M2

## 2025-02-24 DIAGNOSIS — M51.16 INTERVERTEBRAL DISC DISORDERS WITH RADICULOPATHY, LUMBAR REGION: ICD-10-CM

## 2025-02-24 DIAGNOSIS — M54.16 RADICULOPATHY, LUMBAR REGION: ICD-10-CM

## 2025-02-24 DIAGNOSIS — O35.9XX0 MATERNAL CARE FOR (SUSPECTED) FETAL ABNORMALITY AND DAMAGE, UNSPECIFIED, NOT APPLICABLE OR UNSPECIFIED: ICD-10-CM

## 2025-02-24 PROCEDURE — 99204 OFFICE O/P NEW MOD 45 MIN: CPT

## 2025-02-24 RX ORDER — MELOXICAM 15 MG/1
15 TABLET ORAL
Qty: 30 | Refills: 1 | Status: ACTIVE | COMMUNITY
Start: 2025-02-24 | End: 1900-01-01

## 2025-02-24 RX ORDER — GABAPENTIN 300 MG/1
300 CAPSULE ORAL TWICE DAILY
Qty: 60 | Refills: 0 | Status: ACTIVE | COMMUNITY
Start: 2025-02-24 | End: 1900-01-01

## 2025-02-24 RX ORDER — CYCLOBENZAPRINE HYDROCHLORIDE 10 MG/1
10 TABLET, FILM COATED ORAL
Qty: 30 | Refills: 1 | Status: ACTIVE | COMMUNITY
Start: 2025-02-24 | End: 1900-01-01

## 2025-02-28 ENCOUNTER — NON-APPOINTMENT (OUTPATIENT)
Age: 35
End: 2025-02-28

## 2025-03-20 ENCOUNTER — APPOINTMENT (OUTPATIENT)
Dept: PHYSICAL MEDICINE AND REHAB | Facility: AMBULATORY SURGERY CENTER | Age: 35
End: 2025-03-20
Payer: COMMERCIAL

## 2025-03-20 DIAGNOSIS — M51.16 INTERVERTEBRAL DISC DISORDERS WITH RADICULOPATHY, LUMBAR REGION: ICD-10-CM

## 2025-03-20 DIAGNOSIS — M54.16 RADICULOPATHY, LUMBAR REGION: ICD-10-CM

## 2025-03-20 PROCEDURE — 62323 NJX INTERLAMINAR LMBR/SAC: CPT

## 2025-06-15 ENCOUNTER — EMERGENCY (EMERGENCY)
Facility: HOSPITAL | Age: 35
LOS: 0 days | Discharge: HOME | End: 2025-06-15
Attending: EMERGENCY MEDICINE
Payer: COMMERCIAL

## 2025-06-15 VITALS — WEIGHT: 148.59 LBS

## 2025-06-15 VITALS
OXYGEN SATURATION: 100 % | HEART RATE: 80 BPM | SYSTOLIC BLOOD PRESSURE: 115 MMHG | RESPIRATION RATE: 20 BRPM | TEMPERATURE: 98 F | DIASTOLIC BLOOD PRESSURE: 67 MMHG

## 2025-06-15 DIAGNOSIS — Z90.49 ACQUIRED ABSENCE OF OTHER SPECIFIED PARTS OF DIGESTIVE TRACT: Chronic | ICD-10-CM

## 2025-06-15 LAB
ALBUMIN SERPL ELPH-MCNC: 3 G/DL — LOW (ref 3.3–5)
ALP SERPL-CCNC: 60 U/L — SIGNIFICANT CHANGE UP (ref 40–120)
ALT FLD-CCNC: 25 U/L — SIGNIFICANT CHANGE UP (ref 12–78)
ANION GAP SERPL CALC-SCNC: 4 MMOL/L — LOW (ref 5–17)
APPEARANCE UR: ABNORMAL
AST SERPL-CCNC: 18 U/L — SIGNIFICANT CHANGE UP (ref 15–37)
BACTERIA # UR AUTO: NEGATIVE /HPF — SIGNIFICANT CHANGE UP
BASOPHILS # BLD AUTO: 0.05 K/UL — SIGNIFICANT CHANGE UP (ref 0–0.2)
BASOPHILS NFR BLD AUTO: 0.3 % — SIGNIFICANT CHANGE UP (ref 0–2)
BILIRUB SERPL-MCNC: 0.4 MG/DL — SIGNIFICANT CHANGE UP (ref 0.2–1.2)
BILIRUB UR-MCNC: NEGATIVE — SIGNIFICANT CHANGE UP
BLD GP AB SCN SERPL QL: SIGNIFICANT CHANGE UP
BUN SERPL-MCNC: 8 MG/DL — SIGNIFICANT CHANGE UP (ref 7–23)
CALCIUM SERPL-MCNC: 8.8 MG/DL — SIGNIFICANT CHANGE UP (ref 8.5–10.1)
CAST: 3 /LPF — SIGNIFICANT CHANGE UP (ref 0–4)
CHLORIDE SERPL-SCNC: 106 MMOL/L — SIGNIFICANT CHANGE UP (ref 96–108)
CO2 SERPL-SCNC: 26 MMOL/L — SIGNIFICANT CHANGE UP (ref 22–31)
COLOR SPEC: ABNORMAL
CREAT SERPL-MCNC: 0.63 MG/DL — SIGNIFICANT CHANGE UP (ref 0.5–1.3)
DIFF PNL FLD: ABNORMAL
EGFR: 119 ML/MIN/1.73M2 — SIGNIFICANT CHANGE UP
EGFR: 119 ML/MIN/1.73M2 — SIGNIFICANT CHANGE UP
EOSINOPHIL # BLD AUTO: 0.02 K/UL — SIGNIFICANT CHANGE UP (ref 0–0.5)
EOSINOPHIL NFR BLD AUTO: 0.1 % — SIGNIFICANT CHANGE UP (ref 0–6)
GLUCOSE SERPL-MCNC: 159 MG/DL — HIGH (ref 70–99)
GLUCOSE UR QL: NEGATIVE MG/DL — SIGNIFICANT CHANGE UP
HCG SERPL-ACNC: 985 MIU/ML — HIGH
HCT VFR BLD CALC: 34.4 % — LOW (ref 34.5–45)
HGB BLD-MCNC: 11.1 G/DL — LOW (ref 11.5–15.5)
IMM GRANULOCYTES # BLD AUTO: 0.05 K/UL — SIGNIFICANT CHANGE UP (ref 0–0.07)
IMM GRANULOCYTES NFR BLD AUTO: 0.3 % — SIGNIFICANT CHANGE UP (ref 0–0.9)
KETONES UR QL: NEGATIVE MG/DL — SIGNIFICANT CHANGE UP
LEUKOCYTE ESTERASE UR-ACNC: ABNORMAL
LIDOCAIN IGE QN: 25 U/L — SIGNIFICANT CHANGE UP (ref 13–75)
LYMPHOCYTES # BLD AUTO: 1.16 K/UL — SIGNIFICANT CHANGE UP (ref 1–3.3)
LYMPHOCYTES NFR BLD AUTO: 8 % — LOW (ref 13–44)
MCHC RBC-ENTMCNC: 27.1 PG — SIGNIFICANT CHANGE UP (ref 27–34)
MCHC RBC-ENTMCNC: 32.3 G/DL — SIGNIFICANT CHANGE UP (ref 32–36)
MCV RBC AUTO: 83.9 FL — SIGNIFICANT CHANGE UP (ref 80–100)
MONOCYTES # BLD AUTO: 0.41 K/UL — SIGNIFICANT CHANGE UP (ref 0–0.9)
MONOCYTES NFR BLD AUTO: 2.8 % — SIGNIFICANT CHANGE UP (ref 2–14)
NEUTROPHILS # BLD AUTO: 12.76 K/UL — HIGH (ref 1.8–7.4)
NEUTROPHILS NFR BLD AUTO: 88.5 % — HIGH (ref 43–77)
NITRITE UR-MCNC: NEGATIVE — SIGNIFICANT CHANGE UP
NRBC # BLD AUTO: 0 K/UL — SIGNIFICANT CHANGE UP (ref 0–0)
NRBC # FLD: 0 K/UL — SIGNIFICANT CHANGE UP (ref 0–0)
NRBC BLD AUTO-RTO: 0 /100 WBCS — SIGNIFICANT CHANGE UP (ref 0–0)
PH UR: 6 — SIGNIFICANT CHANGE UP (ref 5–8)
PLATELET # BLD AUTO: 296 K/UL — SIGNIFICANT CHANGE UP (ref 150–400)
PMV BLD: 10.6 FL — SIGNIFICANT CHANGE UP (ref 7–13)
POTASSIUM SERPL-MCNC: 3.6 MMOL/L — SIGNIFICANT CHANGE UP (ref 3.5–5.3)
POTASSIUM SERPL-SCNC: 3.6 MMOL/L — SIGNIFICANT CHANGE UP (ref 3.5–5.3)
PROT SERPL-MCNC: 6.9 GM/DL — SIGNIFICANT CHANGE UP (ref 6–8.3)
PROT UR-MCNC: NEGATIVE MG/DL — SIGNIFICANT CHANGE UP
RBC # BLD: 4.1 M/UL — SIGNIFICANT CHANGE UP (ref 3.8–5.2)
RBC # FLD: 13.6 % — SIGNIFICANT CHANGE UP (ref 10.3–14.5)
RBC CASTS # UR COMP ASSIST: 940 /HPF — HIGH (ref 0–4)
SODIUM SERPL-SCNC: 136 MMOL/L — SIGNIFICANT CHANGE UP (ref 135–145)
SP GR SPEC: 1.01 — SIGNIFICANT CHANGE UP (ref 1–1.03)
SQUAMOUS # UR AUTO: 0 /HPF — SIGNIFICANT CHANGE UP (ref 0–5)
UROBILINOGEN FLD QL: 0.2 MG/DL — SIGNIFICANT CHANGE UP (ref 0.2–1)
WBC # BLD: 14.45 K/UL — HIGH (ref 3.8–10.5)
WBC # FLD AUTO: 14.45 K/UL — HIGH (ref 3.8–10.5)
WBC UR QL: 3 /HPF — SIGNIFICANT CHANGE UP (ref 0–5)

## 2025-06-15 PROCEDURE — 99283 EMERGENCY DEPT VISIT LOW MDM: CPT

## 2025-06-15 PROCEDURE — 81001 URINALYSIS AUTO W/SCOPE: CPT

## 2025-06-15 PROCEDURE — 76830 TRANSVAGINAL US NON-OB: CPT

## 2025-06-15 PROCEDURE — 86901 BLOOD TYPING SEROLOGIC RH(D): CPT

## 2025-06-15 PROCEDURE — 76817 TRANSVAGINAL US OBSTETRIC: CPT

## 2025-06-15 PROCEDURE — 84702 CHORIONIC GONADOTROPIN TEST: CPT

## 2025-06-15 PROCEDURE — 93010 ELECTROCARDIOGRAM REPORT: CPT

## 2025-06-15 PROCEDURE — 83690 ASSAY OF LIPASE: CPT

## 2025-06-15 PROCEDURE — 36000 PLACE NEEDLE IN VEIN: CPT

## 2025-06-15 PROCEDURE — 76817 TRANSVAGINAL US OBSTETRIC: CPT | Mod: 26

## 2025-06-15 PROCEDURE — 80053 COMPREHEN METABOLIC PANEL: CPT

## 2025-06-15 PROCEDURE — 85025 COMPLETE CBC W/AUTO DIFF WBC: CPT

## 2025-06-15 PROCEDURE — 86850 RBC ANTIBODY SCREEN: CPT

## 2025-06-15 PROCEDURE — 36415 COLL VENOUS BLD VENIPUNCTURE: CPT

## 2025-06-15 PROCEDURE — 86900 BLOOD TYPING SEROLOGIC ABO: CPT

## 2025-06-15 PROCEDURE — 99285 EMERGENCY DEPT VISIT HI MDM: CPT | Mod: 25

## 2025-06-15 PROCEDURE — 93005 ELECTROCARDIOGRAM TRACING: CPT

## 2025-06-15 PROCEDURE — 99285 EMERGENCY DEPT VISIT HI MDM: CPT

## 2025-06-15 RX ADMIN — Medication 1000 MILLILITER(S): at 13:46

## 2025-06-15 NOTE — ED PROVIDER NOTE - CLINICAL SUMMARY MEDICAL DECISION MAKING FREE TEXT BOX
34 year old female with PMHx of PCOS, DM (on Metformin), neuropathy; BIB pvt car from home to the ED c/o heavy vaginal bleeding today w/ associated lightheadedness.  + H/o irregular menses, last was 3 mos. ago, not on OCPs.  Today changing pads q 5 mins. 34 year old female with PMHx of PCOS, DM (on Metformin), neuropathy; BIB pvt car from home to the ED c/o heavy vaginal bleeding today w/ associated lightheadedness.  + H/o irregular menses, last was 3 mos. ago, not on OCPs.  Today changing pads q 5 mins., incl. passing large clots.  Clinical concern for DUB, r/o pregnant.    Plan: Labs incl. CBC, CNMP, B-HCG quant., T+S, U/A.  pelvic u/s. IVF.  Observe, reassess, Gyn consult.    14;44, CC:  Labs notable for stable Hgb 11, mild elev. WBC 14.5, & elev. B-hCG 985.  Pelvic u/s done, report pending. Gyn resident aware of ED consult. 34 year old female with PMHx of PCOS, DM (on Metformin), neuropathy; BIB pvt car from home to the ED c/o heavy vaginal bleeding today w/ associated lightheadedness.  + H/o irregular menses, last was 3 mos. ago, not on OCPs.  Today changing pads q 5 mins., incl. passing large clots.  Clinical concern for DUB, r/o pregnant.    Plan: Labs incl. CBC, CMP, B-HCG quant., T+S, U/A.  pelvic u/s. IVF.  Observe, reassess, Gyn consult.    14:44, CC:  Labs notable for stable Hgb 11, mild elev. WBC 14.5, & elev. B-hCG 985.  Pelvic u/s done, report pending. Gyn resident aware of ED consult.    See Progress Notes for case progression, incl: pelvis u/s report review, OB/Gyn consultation, pt reassessment & discharge disposition.

## 2025-06-15 NOTE — ED STATDOCS - PROGRESS NOTE DETAILS
Carl Vidales for attending Dr. Zapata  34 year old female with PMHx of PCOS presenting to the ED c/o vaginal bleeding. Pt reports that she usually has infrequent period. Pt is 19 months postpartum. Pt has been off birth control for 6 months and reports that this morning she got her period after about 3 months and reports heavy vaginal bleeding, with large blood clots, and having to change pads every 5 minutes. Pt is endorsing lightheadedness and dizziness. Pt denies chest pain. Will send to the main for further workup and evaluation.

## 2025-06-15 NOTE — ED ADULT NURSE NOTE - NS ED NOTE ABUSE SUSPICION NEGLECT YN
Health Maintenance Due   Topic Date Due   • Colorectal Cancer Screen-  Never done   • COVID-19 Vaccine (3 - Booster for Pfizer series) 11/08/2021   • Breast Cancer Screening  11/20/2022       Patient is up to date, no discussion needed.     No

## 2025-06-15 NOTE — CONSULT NOTE ADULT - SUBJECTIVE AND OBJECTIVE BOX
HPI: 34y   w/ LMP 3-4 months ago presents with with heavy vaginal bleeding. Patient reports having no menses for 3-4 months, which is normal for her given her PCOS. This morning she began to have heavy menses, with passage of hugh sized clots. She was changing her pad 5x in an hour. She had slight lightheadedness and dizziness, prompting her to come to the ED. She had some mild abdominal cramping. Denies fevers, chills, lightheadedness currently. She did not know she was pregnant before today.    PMH: DM  PSH: denies  OB:  x2  GYN: +PCOS  ALL: No Known Allergies  MEDS: denies    Vital Signs Last 24 Hrs  T(C): 36.8 (15 Edmundo 2025 11:52), Max: 36.8 (15 Edmundo 2025 11:52)  T(F): 98.3 (15 Edmundo 2025 11:52), Max: 98.3 (15 Edmundo 2025 11:52)  HR: 89 (15 Edmundo 2025 11:52) (89 - 89)  BP: 93/66 (15 Edmundo 2025 11:52) (93/66 - 93/66)  BP(mean): 75 (15 Edmundo 2025 11:52) (75 - 75)  RR: 18 (15 Edmundo 2025 11:52) (18 - 18)  SpO2: 100% (15 Edmundo 2025 11:52) (100% - 100%)    Parameters below as of 15 Edmundo 2025 11:52  Patient On (Oxygen Delivery Method): room air         PHYSICAL EXAM:  CHEST/LUNG: Breathing comfortably on room air   ABDOMEN: Soft, Nontender, Nondistended   EXTREMITIES: No clubbing, cyanosis, or edema  PELVIC:        EXTERNAL GENITALIA: Normal. No rashes or lesions noted.         VAGINA: healthy pink mucosa, no gross lesions, no discharge noted, 10cc clot in vaginal vault with further 10cc of old blood         CERVIX: no lesions. closed, no CMT noted.         UTERUS: normal size, nontender, mobile        ADNEXA: no adnexal masses or tenderness appreciated.    LABS:                        11.1   14.45 )-----------( 296      ( 15 Edmundo 2025 13:51 )             34.4     06-15    136  |  106  |  8   ----------------------------<  159[H]  3.6   |  26  |  0.63    Ca    8.8      15 Edmundo 2025 13:51    TPro  6.9  /  Alb  3.0[L]  /  TBili  0.4  /  DBili  x   /  AST  18  /  ALT  25  /  AlkPhos  60  06-15    Urinalysis Basic - ( 15 Edmundo 2025 13:51 )    Color: x / Appearance: x / SG: x / pH: x  Gluc: 159 mg/dL / Ketone: x  / Bili: x / Urobili: x   Blood: x / Protein: x / Nitrite: x   Leuk Esterase: x / RBC: x / WBC x   Sq Epi: x / Non Sq Epi: x / Bacteria: x    b-    RADIOLOGY STUDIES:  < from: US Transvaginal, OB (06.15.25 @ 13:36) >  INTERPRETATION:  CLINICAL INFORMATION: 34 years  Female with pelvic pain.   19 months postpartum. Heavy vaginal bleeding passing clots.    LMP: Unknown    COMPARISON: OB ultrasound 3/20/2023    TECHNIQUE: Endovaginal and transabdominal pelvic sonogram.    FINDINGS:  Uterus: 9.7 x 5.9 x 6.8 cm.    Abnormally thickened and heterogeneous endometrium measuring up to 2.1   cm. Multiple small cystic areas within the endometrium. No discrete   intrauterine gestational sac.    Right ovary: 1.9 cm x 2.3 cm x 1.5 cm. Within normal limits. Bloodflow is   documented in the right ovary.  Left ovary: 2.3 cm x 1.7 cm x 2.0 cm. Within normal limits. Bloodflow is   documented in the left ovary.    Fluid: Trace free fluid in the pelvis    IMPRESSION:  Abnormally thickened heterogeneous endometrium. No discrete intrauterine   gestational sac. If there is a positive pregnancy test,differential   diagnosis includes early IUP, spontaneous  and ectopic pregnancy.   Gestational trophoblastic disease is not excluded.      Findings were discussed with HENRI Barahona 6/15/2025 3:58 PM by Dr. Martine Mercado with read back confirmation.    < end of copied text >

## 2025-06-15 NOTE — CONSULT NOTE ADULT - ASSESSMENT
A/P:  CONCHITA PIZARRO is a  34y   w/ LMP3-4 months ago, GA unknown presenting with vaginal bleeding and positive b-HCG    Vital signs stable  Hgb 11.1  b-, no prior measurements  Abdominal exam unremarkable, moderate vaginal bleeding noted on exam with some clots  TVUS with thickened endometrium 2.1 cm and multiple cystic structures, adnexa WNL, consistent with pregnancy of unknown location  Discussed PUL -  hCG of 985 below discriminatory zone and imaging without intrauterine gestational sac or adnexal structures. Discussed possibility of early normal IUP, failing IUP, ectopic pregnancy  Discussed that with heavy vaginal bleeding, this is most likely a miscarriage, but without further pregnancy hormones to trend or previous ultrasound we cannot confirm  Understands to return in 48h for repeat bhCG with Dr. Huffman   Recommended Tylenol 975mg q 6 hrs for cramping   Return precautions given for fevers, chills, heavy vaginal bleeding associated with lightheadedness or dizziness or significant abdominal pain  Cleared for discharge    Discussed with Dr. Torre

## 2025-06-15 NOTE — ED PROVIDER NOTE - OBJECTIVE STATEMENT
34 year old female with PMHx of PCOS, DM (taking Metformin), and Neuropathy presenting to the ED c/o vaginal bleeding. Pt reports that she usually has infrequent period. Pt is 19 months postpartum. Pt has been off birth control for 6 months and reports that this morning she got her period after about 3 months and reports heavy vaginal bleeding, with large blood clots, and having to change pads every 5 minutes. Pt is endorsing lightheadedness and dizziness. Pt denies chest pain. Will send to the main for further workup and evaluation. 34 year old female with PMHx of PCOS, DM (on Metformin), neuropathy; BIB pvt car from home to the ED c/o heavy vaginal bleeding today w/ associated lightheadedness. +Usually has infrequent periods. Pt is 19 months postpartum. Pt has been off birth control > 6 months and reports that this morning she got her period after about 3 months, + subsequent heavy vaginal bleeding including large blood clots, + having to change pads every 5 minutes. Pt is endorsing lightheadedness/dizziness, no chest pain, SOB nor LOC. Mild low abd/pelvic cramping discomfort.  No F/C, urine c/o.  Pt sent to Main ED for further workup and evaluation.

## 2025-06-15 NOTE — ED ADULT TRIAGE NOTE - CHIEF COMPLAINT QUOTE
Pt presents to the ED c.o vaginal bleeding. Pt reports that she usually has infrequent period, hx of PCOS. Pt reports that this morning she got her period after about 3 months and reports heavy vaginal bleeding, large blood clots, changing pads every 5 minutes, endorses light handedness and dizziness. Denies chest pain.

## 2025-06-15 NOTE — ED PROVIDER NOTE - CARE PROVIDER_API CALL
Ana Cristina Huffman  Obstetrics and Gynecology  [address], [address]  [place] [address]  Phone: [phone]  Fax: [phone]  Follow Up Time: Urgent

## 2025-06-15 NOTE — ED ADULT NURSE NOTE - NSFALLRISKINTERV_ED_ALL_ED

## 2025-06-15 NOTE — ED PROVIDER NOTE - NSFOLLOWUPINSTRUCTIONS_ED_ALL_ED_FT
Patient advised to follow up with gynecologist Dr. Huffman for follow up on monday for repeat blood work and US.  patient and family understands and agrees with plan.  Return to ER if symptoms worsens or develop new symptoms.     Vaginal Bleeding During Pregnancy, First Trimester  A small amount of bleeding from the vagina, or spotting, is common during early pregnancy. Some bleeding may be related to the pregnancy, and some may not. In many cases, the bleeding is normal and is not a problem. However, bleeding can also be a sign of something serious.    Normal things that may cause bleeding during the first trimester:  Implantation of the fertilized egg in the lining of the uterus.  Rapid changes in blood vessels. This is caused by changes that are happening to the body during pregnancy.  Sex.  Pelvic exams.  Abnormal things that may cause bleeding during the first trimester include:  Infection or inflammation of the cervix.  Growths or polyps on the cervix.  Miscarriage or threatened miscarriage.  Pregnancy that is growing outside of the uterus (ectopic pregnancy).  A fertilized egg that becomes a mass of tissue (molar pregnancy).  Tell your health care provider right away if there is any bleeding from your vagina.    Follow these instructions at home:  Monitoring your bleeding      Monitor your bleeding.  Pay attention to any changes in your symptoms. Let your health care provider know about any concerns.  Try to understand when the bleeding occurs. Does the bleeding start on its own, or does it start after something is done, such as sex or a pelvic exam?  Use a diary to record the things you see about your bleeding, including:  The kind of bleeding you are having. Does the bleeding start and stop irregularly, or is it a constant flow?  The severity of your bleeding. Is the bleeding heavy or light?  The number of pads you use each day, how often you change them, and how soaked they are.  Tell your health care provider if you pass tissue. He or she may want to see it.  Activity    Follow instructions from your health care provider about limiting your activity. Ask what activities are safe for you.  Do not have sex until your health care provider says that this is safe.  If needed, make plans for someone to help with your regular activities.  General instructions    Take over-the-counter and prescription medicines only as told by your health care provider.  Do not take aspirin because it can cause bleeding.  Do not use tampons or douche.  Keep all follow-up visits. This is important.  Contact a health care provider if:  You have vaginal bleeding during any part of your pregnancy.  You have cramps or labor pains.  You have a fever or chills.  Get help right away if:  You have severe cramps in your back or abdomen.  You pass large clots or a large amount of tissue from your vagina.  Your bleeding increases.  You feel light-headed or weak, or you faint.  You are leaking fluid or have a gush of fluid from your vagina.  Summary  A small amount of bleeding from the vagina is common during early pregnancy.  Be sure to tell your health care provider about any vaginal bleeding right away.  Try to understand when bleeding occurs. Does bleeding occur on its own, or does it occur after something is done, such as sex or pelvic exams?  Keep all follow-up visits. This is important.  This information is not intended to replace advice given to you by your health care provider. Make sure you discuss any questions you have with your health care provider.

## 2025-06-15 NOTE — ED PROVIDER NOTE - PATIENT PORTAL LINK FT
You can access the FollowMyHealth Patient Portal offered by [facility] by registering at the following website: [email or web address]. By joining [facility]’s FollowMyHealth portal, you will also be able to view your health information using other applications (apps) compatible with our system.

## 2025-06-15 NOTE — CONSULT NOTE ADULT - ATTENDING COMMENTS
I personally obtained a complete history and performed a directed physical examination. Clinical presentation is c/w a PUL. The patient is hemodynamically stable. H/H stable. The plan is to obtain a follow up beta hCG in 2 days. The private obstetrician, Dr. Huffman was contacted regarding her patient.

## 2025-06-15 NOTE — ED PROVIDER NOTE - PROGRESS NOTE DETAILS
US with Abnormally thickened heterogeneous endometrium. No discrete intrauterine gestational sac. If there is a positive pregnancy test, differential diagnosis includes early IUP, spontaneous  and ectopic pregnancy. Gestational trophoblastic disease is not excluded.   GYN Dr. Torre evaluated patient and discussed US /beta quant results, recommended no acute intervention in ER at present. possible spontaneous . she will follow up with her gyn Dr. Huffman for repeat blood work and us. discussed the results and findings ,  Signs, symptoms, results were reviewed with patient.  Pt agreed to return if symptoms return and/or worsen.  Pt agrees to follow up with GYN. Red Flags discussed with patient. Patient understands to follow-up. All this was discussed in layman's terms with the patient. Danny Quiroz DO Attending Physician: pt received in s/o from Dr. sanchez. here for vaginal bleeding with elevated b quant. pending US and gyn evaluation.

## 2025-06-15 NOTE — ED PROVIDER NOTE - CARE PROVIDERS DIRECT ADDRESSES
,DirectAddress_Unknown
Pt axo3, sent in from Dr. Levy's office for infection of right breast implant. redness present to rt breast, pt also reports pain. ANASTACIO drain present with serous fluid present. pt received BL mastectomy 12/12. safety maintained.

## 2025-06-15 NOTE — ED PROVIDER NOTE - PHYSICAL EXAMINATION
Gen'l: F adult, alert, no respiratory discomfort, no sentence shortening, non-toxic.  Head: NC/AT  Eyes: PERRL, EOMI, conjunctivae pink  ENT: O/P clear, mm failry moist  CV: RRR, normal radial pulse  Lungs: CTA, normal respirations  GI: soft, NT, BS+  : Deferred, no flank nor CVAT  MSK: MICHAEL x 4, no focal extremity swelling nor tenderness, B/L SLR 35 degrees w/o pain, normal motor  Skin: no tactile warmth, no rash, no pallor nor diaphoresis  Neuro: A+O x 4, CN 2 -12 intact, normal speech, no focal motor/sensory deficits Gen'l: F adult, alert, no respiratory discomfort, no sentence shortening, non-toxic.  Head: NC/AT  Eyes: PERRL, EOMI, conjunctivae pink  ENT: O/P clear, mm fairly moist  CV: RRR, normal radial pulse  Lungs: CTA, normal respirations  GI: soft, NT, BS+  : Deferred, no flank nor CVAT  MSK: MICHAEL x 4, no focal extremity swelling nor tenderness, B/L SLR 35 degrees w/o pain, normal motor  Skin: no tactile warmth, no rash, no pallor nor diaphoresis  Neuro: A+O x 4, CN 2 -12 intact, normal speech, no focal motor/sensory deficits